# Patient Record
Sex: FEMALE | Race: WHITE | NOT HISPANIC OR LATINO | Employment: OTHER | URBAN - NONMETROPOLITAN AREA
[De-identification: names, ages, dates, MRNs, and addresses within clinical notes are randomized per-mention and may not be internally consistent; named-entity substitution may affect disease eponyms.]

---

## 2021-12-09 ENCOUNTER — HOSPITAL ENCOUNTER (OUTPATIENT)
Dept: LAB | Facility: MEDICAL CENTER | Age: 66
End: 2021-12-09
Attending: STUDENT IN AN ORGANIZED HEALTH CARE EDUCATION/TRAINING PROGRAM
Payer: MEDICAID

## 2021-12-09 LAB
ALBUMIN SERPL BCP-MCNC: 4.3 G/DL (ref 3.2–4.9)
ALBUMIN/GLOB SERPL: 1.4 G/DL
ALP SERPL-CCNC: 94 U/L (ref 30–99)
ALT SERPL-CCNC: 21 U/L (ref 2–50)
ANION GAP SERPL CALC-SCNC: 12 MMOL/L (ref 7–16)
AST SERPL-CCNC: 17 U/L (ref 12–45)
BILIRUB SERPL-MCNC: 1 MG/DL (ref 0.1–1.5)
BUN SERPL-MCNC: 13 MG/DL (ref 8–22)
CALCIUM SERPL-MCNC: 9.5 MG/DL (ref 8.5–10.5)
CHLORIDE SERPL-SCNC: 99 MMOL/L (ref 96–112)
CO2 SERPL-SCNC: 26 MMOL/L (ref 20–33)
CREAT SERPL-MCNC: 0.98 MG/DL (ref 0.5–1.4)
FASTING STATUS PATIENT QL REPORTED: NORMAL
GLOBULIN SER CALC-MCNC: 3.1 G/DL (ref 1.9–3.5)
GLUCOSE SERPL-MCNC: 192 MG/DL (ref 65–99)
POTASSIUM SERPL-SCNC: 3.6 MMOL/L (ref 3.6–5.5)
PROT SERPL-MCNC: 7.4 G/DL (ref 6–8.2)
SODIUM SERPL-SCNC: 137 MMOL/L (ref 135–145)

## 2021-12-09 PROCEDURE — 80053 COMPREHEN METABOLIC PANEL: CPT

## 2021-12-09 PROCEDURE — 36415 COLL VENOUS BLD VENIPUNCTURE: CPT

## 2022-01-20 ENCOUNTER — HOSPITAL ENCOUNTER (OUTPATIENT)
Dept: LAB | Facility: MEDICAL CENTER | Age: 67
End: 2022-01-20
Attending: STUDENT IN AN ORGANIZED HEALTH CARE EDUCATION/TRAINING PROGRAM
Payer: MEDICAID

## 2022-01-20 LAB
ANION GAP SERPL CALC-SCNC: 13 MMOL/L (ref 7–16)
BUN SERPL-MCNC: 20 MG/DL (ref 8–22)
CALCIUM SERPL-MCNC: 9.8 MG/DL (ref 8.5–10.5)
CHLORIDE SERPL-SCNC: 102 MMOL/L (ref 96–112)
CO2 SERPL-SCNC: 25 MMOL/L (ref 20–33)
CREAT SERPL-MCNC: 1.02 MG/DL (ref 0.5–1.4)
FASTING STATUS PATIENT QL REPORTED: NORMAL
GLUCOSE SERPL-MCNC: 142 MG/DL (ref 65–99)
POTASSIUM SERPL-SCNC: 3.6 MMOL/L (ref 3.6–5.5)
SODIUM SERPL-SCNC: 140 MMOL/L (ref 135–145)

## 2022-01-20 PROCEDURE — 36415 COLL VENOUS BLD VENIPUNCTURE: CPT

## 2022-01-20 PROCEDURE — 80048 BASIC METABOLIC PNL TOTAL CA: CPT

## 2022-05-18 ENCOUNTER — APPOINTMENT (OUTPATIENT)
Dept: RADIOLOGY | Facility: MEDICAL CENTER | Age: 67
DRG: 698 | End: 2022-05-18
Attending: EMERGENCY MEDICINE
Payer: MEDICAID

## 2022-05-18 ENCOUNTER — HOSPITAL ENCOUNTER (INPATIENT)
Facility: MEDICAL CENTER | Age: 67
LOS: 5 days | DRG: 698 | End: 2022-05-23
Attending: EMERGENCY MEDICINE | Admitting: INTERNAL MEDICINE
Payer: MEDICAID

## 2022-05-18 ENCOUNTER — HOSPITAL ENCOUNTER (OUTPATIENT)
Facility: MEDICAL CENTER | Age: 67
DRG: 698 | End: 2022-05-18
Attending: NURSE PRACTITIONER
Payer: MEDICAID

## 2022-05-18 DIAGNOSIS — N39.0 COMPLICATED UTI (URINARY TRACT INFECTION): ICD-10-CM

## 2022-05-18 DIAGNOSIS — N10 ACUTE PYELONEPHRITIS: ICD-10-CM

## 2022-05-18 DIAGNOSIS — R78.81 BACTEREMIA: ICD-10-CM

## 2022-05-18 DIAGNOSIS — A41.9 SEPSIS WITHOUT ACUTE ORGAN DYSFUNCTION, DUE TO UNSPECIFIED ORGANISM (HCC): ICD-10-CM

## 2022-05-18 LAB
ALBUMIN SERPL BCP-MCNC: 3.7 G/DL (ref 3.2–4.9)
ALBUMIN/GLOB SERPL: 1.2 G/DL
ALP SERPL-CCNC: 99 U/L (ref 30–99)
ALT SERPL-CCNC: 10 U/L (ref 2–50)
ANION GAP SERPL CALC-SCNC: 13 MMOL/L (ref 7–16)
APPEARANCE UR: ABNORMAL
AST SERPL-CCNC: 15 U/L (ref 12–45)
BACTERIA #/AREA URNS HPF: ABNORMAL /HPF
BASOPHILS # BLD AUTO: 0.6 % (ref 0–1.8)
BASOPHILS # BLD: 0.07 K/UL (ref 0–0.12)
BILIRUB SERPL-MCNC: 1.1 MG/DL (ref 0.1–1.5)
BILIRUB UR QL STRIP.AUTO: NEGATIVE
BUN SERPL-MCNC: 12 MG/DL (ref 8–22)
CALCIUM SERPL-MCNC: 8.8 MG/DL (ref 8.5–10.5)
CHLORIDE SERPL-SCNC: 101 MMOL/L (ref 96–112)
CO2 SERPL-SCNC: 22 MMOL/L (ref 20–33)
COLOR UR: YELLOW
CREAT SERPL-MCNC: 0.78 MG/DL (ref 0.5–1.4)
EOSINOPHIL # BLD AUTO: 0.09 K/UL (ref 0–0.51)
EOSINOPHIL NFR BLD: 0.7 % (ref 0–6.9)
EPI CELLS #/AREA URNS HPF: NEGATIVE /HPF
ERYTHROCYTE [DISTWIDTH] IN BLOOD BY AUTOMATED COUNT: 40.6 FL (ref 35.9–50)
GFR SERPLBLD CREATININE-BSD FMLA CKD-EPI: 84 ML/MIN/1.73 M 2
GLOBULIN SER CALC-MCNC: 3.2 G/DL (ref 1.9–3.5)
GLUCOSE BLD STRIP.AUTO-MCNC: 166 MG/DL (ref 65–99)
GLUCOSE SERPL-MCNC: 164 MG/DL (ref 65–99)
GLUCOSE UR STRIP.AUTO-MCNC: NEGATIVE MG/DL
HCT VFR BLD AUTO: 38 % (ref 37–47)
HGB BLD-MCNC: 12.3 G/DL (ref 12–16)
IMM GRANULOCYTES # BLD AUTO: 0.06 K/UL (ref 0–0.11)
IMM GRANULOCYTES NFR BLD AUTO: 0.5 % (ref 0–0.9)
KETONES UR STRIP.AUTO-MCNC: NEGATIVE MG/DL
LACTATE BLD-SCNC: 1.4 MMOL/L (ref 0.5–2)
LEUKOCYTE ESTERASE UR QL STRIP.AUTO: ABNORMAL
LYMPHOCYTES # BLD AUTO: 1.33 K/UL (ref 1–4.8)
LYMPHOCYTES NFR BLD: 10.8 % (ref 22–41)
MCH RBC QN AUTO: 26.8 PG (ref 27–33)
MCHC RBC AUTO-ENTMCNC: 32.4 G/DL (ref 33.6–35)
MCV RBC AUTO: 82.8 FL (ref 81.4–97.8)
MICRO URNS: ABNORMAL
MONOCYTES # BLD AUTO: 0.75 K/UL (ref 0–0.85)
MONOCYTES NFR BLD AUTO: 6.1 % (ref 0–13.4)
NEUTROPHILS # BLD AUTO: 10.04 K/UL (ref 2–7.15)
NEUTROPHILS NFR BLD: 81.3 % (ref 44–72)
NITRITE UR QL STRIP.AUTO: POSITIVE
NRBC # BLD AUTO: 0 K/UL
NRBC BLD-RTO: 0 /100 WBC
PH UR STRIP.AUTO: 6.5 [PH] (ref 5–8)
PLATELET # BLD AUTO: 245 K/UL (ref 164–446)
PMV BLD AUTO: 9.6 FL (ref 9–12.9)
POTASSIUM SERPL-SCNC: 3.4 MMOL/L (ref 3.6–5.5)
PROT SERPL-MCNC: 6.9 G/DL (ref 6–8.2)
PROT UR QL STRIP: 100 MG/DL
RBC # BLD AUTO: 4.59 M/UL (ref 4.2–5.4)
RBC # URNS HPF: ABNORMAL /HPF
RBC UR QL AUTO: ABNORMAL
SODIUM SERPL-SCNC: 136 MMOL/L (ref 135–145)
SP GR UR STRIP.AUTO: 1.01
UROBILINOGEN UR STRIP.AUTO-MCNC: 0.2 MG/DL
WBC # BLD AUTO: 12.3 K/UL (ref 4.8–10.8)
WBC #/AREA URNS HPF: ABNORMAL /HPF

## 2022-05-18 PROCEDURE — 81001 URINALYSIS AUTO W/SCOPE: CPT

## 2022-05-18 PROCEDURE — 82962 GLUCOSE BLOOD TEST: CPT

## 2022-05-18 PROCEDURE — 87077 CULTURE AEROBIC IDENTIFY: CPT

## 2022-05-18 PROCEDURE — 700111 HCHG RX REV CODE 636 W/ 250 OVERRIDE (IP): Performed by: EMERGENCY MEDICINE

## 2022-05-18 PROCEDURE — 96374 THER/PROPH/DIAG INJ IV PUSH: CPT

## 2022-05-18 PROCEDURE — 96375 TX/PRO/DX INJ NEW DRUG ADDON: CPT

## 2022-05-18 PROCEDURE — 76775 US EXAM ABDO BACK WALL LIM: CPT

## 2022-05-18 PROCEDURE — 87186 SC STD MICRODIL/AGAR DIL: CPT

## 2022-05-18 PROCEDURE — 71045 X-RAY EXAM CHEST 1 VIEW: CPT

## 2022-05-18 PROCEDURE — 74018 RADEX ABDOMEN 1 VIEW: CPT

## 2022-05-18 PROCEDURE — 770001 HCHG ROOM/CARE - MED/SURG/GYN PRIV*

## 2022-05-18 PROCEDURE — 83605 ASSAY OF LACTIC ACID: CPT

## 2022-05-18 PROCEDURE — 87040 BLOOD CULTURE FOR BACTERIA: CPT

## 2022-05-18 PROCEDURE — 700105 HCHG RX REV CODE 258: Performed by: EMERGENCY MEDICINE

## 2022-05-18 PROCEDURE — 87086 URINE CULTURE/COLONY COUNT: CPT

## 2022-05-18 PROCEDURE — 85025 COMPLETE CBC W/AUTO DIFF WBC: CPT

## 2022-05-18 PROCEDURE — 36415 COLL VENOUS BLD VENIPUNCTURE: CPT

## 2022-05-18 PROCEDURE — 99285 EMERGENCY DEPT VISIT HI MDM: CPT

## 2022-05-18 PROCEDURE — 80053 COMPREHEN METABOLIC PANEL: CPT

## 2022-05-18 PROCEDURE — 99223 1ST HOSP IP/OBS HIGH 75: CPT | Performed by: INTERNAL MEDICINE

## 2022-05-18 RX ORDER — ATORVASTATIN CALCIUM 40 MG/1
40 TABLET, FILM COATED ORAL
Status: DISCONTINUED | OUTPATIENT
Start: 2022-05-19 | End: 2022-05-23 | Stop reason: HOSPADM

## 2022-05-18 RX ORDER — ONDANSETRON 4 MG/1
4 TABLET, ORALLY DISINTEGRATING ORAL EVERY 4 HOURS PRN
Status: DISCONTINUED | OUTPATIENT
Start: 2022-05-18 | End: 2022-05-23 | Stop reason: HOSPADM

## 2022-05-18 RX ORDER — CETIRIZINE HYDROCHLORIDE 10 MG/1
10 TABLET ORAL DAILY
COMMUNITY
Start: 2022-03-11

## 2022-05-18 RX ORDER — DEXTROSE MONOHYDRATE 25 G/50ML
25 INJECTION, SOLUTION INTRAVENOUS
Status: DISCONTINUED | OUTPATIENT
Start: 2022-05-18 | End: 2022-05-23 | Stop reason: HOSPADM

## 2022-05-18 RX ORDER — TRIAMTERENE AND HYDROCHLOROTHIAZIDE 37.5; 25 MG/1; MG/1
1 CAPSULE ORAL DAILY
Status: DISCONTINUED | OUTPATIENT
Start: 2022-05-19 | End: 2022-05-23 | Stop reason: HOSPADM

## 2022-05-18 RX ORDER — BISACODYL 10 MG
10 SUPPOSITORY, RECTAL RECTAL
Status: DISCONTINUED | OUTPATIENT
Start: 2022-05-18 | End: 2022-05-23 | Stop reason: HOSPADM

## 2022-05-18 RX ORDER — AMOXICILLIN 250 MG
2 CAPSULE ORAL 2 TIMES DAILY
Status: DISCONTINUED | OUTPATIENT
Start: 2022-05-19 | End: 2022-05-23 | Stop reason: HOSPADM

## 2022-05-18 RX ORDER — INSULIN DETEMIR 100 [IU]/ML
105 INJECTION, SOLUTION SUBCUTANEOUS EVERY EVENING
COMMUNITY
Start: 2022-04-11

## 2022-05-18 RX ORDER — SODIUM CHLORIDE 9 MG/ML
INJECTION, SOLUTION INTRAVENOUS CONTINUOUS
Status: DISCONTINUED | OUTPATIENT
Start: 2022-05-19 | End: 2022-05-22

## 2022-05-18 RX ORDER — TRIAMTERENE AND HYDROCHLOROTHIAZIDE 37.5; 25 MG/1; MG/1
1 CAPSULE ORAL DAILY
COMMUNITY
Start: 2022-03-25

## 2022-05-18 RX ORDER — TAMSULOSIN HYDROCHLORIDE 0.4 MG/1
0.4 CAPSULE ORAL DAILY
Status: ON HOLD | COMMUNITY
End: 2022-05-23 | Stop reason: SDUPTHER

## 2022-05-18 RX ORDER — ATORVASTATIN CALCIUM 40 MG/1
40 TABLET, FILM COATED ORAL
COMMUNITY
Start: 2022-03-26

## 2022-05-18 RX ORDER — ONDANSETRON 2 MG/ML
4 INJECTION INTRAMUSCULAR; INTRAVENOUS ONCE
Status: COMPLETED | OUTPATIENT
Start: 2022-05-18 | End: 2022-05-18

## 2022-05-18 RX ORDER — ACETAMINOPHEN 325 MG/1
650 TABLET ORAL EVERY 6 HOURS PRN
Status: DISCONTINUED | OUTPATIENT
Start: 2022-05-18 | End: 2022-05-23 | Stop reason: HOSPADM

## 2022-05-18 RX ORDER — POTASSIUM CHLORIDE 750 MG/1
10 TABLET, FILM COATED, EXTENDED RELEASE ORAL DAILY
Status: DISCONTINUED | OUTPATIENT
Start: 2022-05-19 | End: 2022-05-19

## 2022-05-18 RX ORDER — ONDANSETRON 2 MG/ML
4 INJECTION INTRAMUSCULAR; INTRAVENOUS EVERY 4 HOURS PRN
Status: DISCONTINUED | OUTPATIENT
Start: 2022-05-18 | End: 2022-05-23 | Stop reason: HOSPADM

## 2022-05-18 RX ORDER — INSULIN LISPRO 100 [IU]/ML
2-12 INJECTION, SOLUTION INTRAVENOUS; SUBCUTANEOUS
Status: DISCONTINUED | OUTPATIENT
Start: 2022-05-19 | End: 2022-05-23 | Stop reason: HOSPADM

## 2022-05-18 RX ORDER — METOCLOPRAMIDE HYDROCHLORIDE 5 MG/ML
10 INJECTION INTRAMUSCULAR; INTRAVENOUS ONCE
Status: COMPLETED | OUTPATIENT
Start: 2022-05-18 | End: 2022-05-18

## 2022-05-18 RX ORDER — KETOROLAC TROMETHAMINE 30 MG/ML
15 INJECTION, SOLUTION INTRAMUSCULAR; INTRAVENOUS ONCE
Status: COMPLETED | OUTPATIENT
Start: 2022-05-18 | End: 2022-05-18

## 2022-05-18 RX ORDER — TAMSULOSIN HYDROCHLORIDE 0.4 MG/1
0.4 CAPSULE ORAL DAILY
Status: DISCONTINUED | OUTPATIENT
Start: 2022-05-19 | End: 2022-05-23 | Stop reason: HOSPADM

## 2022-05-18 RX ORDER — INSULIN LISPRO 100 [IU]/ML
15 INJECTION, SOLUTION INTRAVENOUS; SUBCUTANEOUS
Status: DISCONTINUED | OUTPATIENT
Start: 2022-05-19 | End: 2022-05-23 | Stop reason: HOSPADM

## 2022-05-18 RX ORDER — SODIUM CHLORIDE 9 MG/ML
1000 INJECTION, SOLUTION INTRAVENOUS ONCE
Status: COMPLETED | OUTPATIENT
Start: 2022-05-18 | End: 2022-05-19

## 2022-05-18 RX ORDER — HYDROCODONE BITARTRATE AND ACETAMINOPHEN 5; 325 MG/1; MG/1
1 TABLET ORAL EVERY 8 HOURS PRN
Status: DISCONTINUED | OUTPATIENT
Start: 2022-05-18 | End: 2022-05-23 | Stop reason: HOSPADM

## 2022-05-18 RX ORDER — HYDROCODONE BITARTRATE AND ACETAMINOPHEN 5; 325 MG/1; MG/1
1 TABLET ORAL EVERY 8 HOURS PRN
COMMUNITY
End: 2022-06-01

## 2022-05-18 RX ORDER — POLYETHYLENE GLYCOL 3350 17 G/17G
1 POWDER, FOR SOLUTION ORAL
Status: DISCONTINUED | OUTPATIENT
Start: 2022-05-18 | End: 2022-05-23 | Stop reason: HOSPADM

## 2022-05-18 RX ORDER — OMEPRAZOLE 20 MG/1
40 CAPSULE, DELAYED RELEASE ORAL DAILY
Status: DISCONTINUED | OUTPATIENT
Start: 2022-05-19 | End: 2022-05-23 | Stop reason: HOSPADM

## 2022-05-18 RX ORDER — PSEUDOEPHEDRINE HCL 30 MG
100 TABLET ORAL
COMMUNITY

## 2022-05-18 RX ORDER — POTASSIUM CHLORIDE 750 MG/1
10 TABLET, FILM COATED, EXTENDED RELEASE ORAL DAILY
COMMUNITY

## 2022-05-18 RX ORDER — B-COMPLEX WITH VITAMIN C
1 TABLET ORAL DAILY
COMMUNITY
End: 2022-06-01

## 2022-05-18 RX ORDER — OXYBUTYNIN CHLORIDE 5 MG/1
5 TABLET ORAL 3 TIMES DAILY PRN
COMMUNITY
End: 2022-06-01

## 2022-05-18 RX ORDER — OMEPRAZOLE 40 MG/1
40 CAPSULE, DELAYED RELEASE ORAL DAILY
COMMUNITY
Start: 2022-02-23

## 2022-05-18 RX ORDER — CHOLECALCIFEROL (VITAMIN D3) 125 MCG
500 CAPSULE ORAL DAILY
Status: DISCONTINUED | OUTPATIENT
Start: 2022-05-19 | End: 2022-05-23 | Stop reason: HOSPADM

## 2022-05-18 RX ORDER — OXYBUTYNIN CHLORIDE 5 MG/1
5 TABLET ORAL 3 TIMES DAILY PRN
Status: DISCONTINUED | OUTPATIENT
Start: 2022-05-18 | End: 2022-05-19

## 2022-05-18 RX ORDER — CETIRIZINE HYDROCHLORIDE 10 MG/1
10 TABLET ORAL DAILY
Status: DISCONTINUED | OUTPATIENT
Start: 2022-05-19 | End: 2022-05-23 | Stop reason: HOSPADM

## 2022-05-18 RX ORDER — CEFTRIAXONE 2 G/1
2 INJECTION, POWDER, FOR SOLUTION INTRAMUSCULAR; INTRAVENOUS ONCE
Status: COMPLETED | OUTPATIENT
Start: 2022-05-18 | End: 2022-05-18

## 2022-05-18 RX ADMIN — CEFTRIAXONE SODIUM 2 G: 2 INJECTION, POWDER, FOR SOLUTION INTRAMUSCULAR; INTRAVENOUS at 21:07

## 2022-05-18 RX ADMIN — METOCLOPRAMIDE 10 MG: 5 INJECTION, SOLUTION INTRAMUSCULAR; INTRAVENOUS at 23:19

## 2022-05-18 RX ADMIN — SODIUM CHLORIDE 1000 ML: 9 INJECTION, SOLUTION INTRAVENOUS at 21:23

## 2022-05-18 RX ADMIN — KETOROLAC TROMETHAMINE 15 MG: 30 INJECTION, SOLUTION INTRAMUSCULAR at 23:19

## 2022-05-18 RX ADMIN — ONDANSETRON 4 MG: 2 INJECTION INTRAMUSCULAR; INTRAVENOUS at 21:12

## 2022-05-19 PROBLEM — E11.9 DM (DIABETES MELLITUS) (HCC): Status: ACTIVE | Noted: 2022-05-19

## 2022-05-19 PROBLEM — N39.0 COMPLICATED UTI (URINARY TRACT INFECTION): Status: ACTIVE | Noted: 2022-05-19

## 2022-05-19 PROBLEM — I10 HTN (HYPERTENSION): Status: ACTIVE | Noted: 2022-05-19

## 2022-05-19 PROBLEM — J30.2 SEASONAL ALLERGIC RHINITIS: Status: ACTIVE | Noted: 2022-05-19

## 2022-05-19 PROBLEM — I63.9 CVA (CEREBRAL VASCULAR ACCIDENT) (HCC): Status: ACTIVE | Noted: 2022-05-19

## 2022-05-19 PROBLEM — K21.9 GERD (GASTROESOPHAGEAL REFLUX DISEASE): Status: ACTIVE | Noted: 2022-05-19

## 2022-05-19 LAB
ANION GAP SERPL CALC-SCNC: 11 MMOL/L (ref 7–16)
BUN SERPL-MCNC: 13 MG/DL (ref 8–22)
CALCIUM SERPL-MCNC: 8.1 MG/DL (ref 8.5–10.5)
CHLORIDE SERPL-SCNC: 100 MMOL/L (ref 96–112)
CO2 SERPL-SCNC: 24 MMOL/L (ref 20–33)
CREAT SERPL-MCNC: 0.97 MG/DL (ref 0.5–1.4)
ERYTHROCYTE [DISTWIDTH] IN BLOOD BY AUTOMATED COUNT: 42.1 FL (ref 35.9–50)
EST. AVERAGE GLUCOSE BLD GHB EST-MCNC: 134 MG/DL
GFR SERPLBLD CREATININE-BSD FMLA CKD-EPI: 65 ML/MIN/1.73 M 2
GLUCOSE BLD STRIP.AUTO-MCNC: 163 MG/DL (ref 65–99)
GLUCOSE BLD STRIP.AUTO-MCNC: 164 MG/DL (ref 65–99)
GLUCOSE BLD STRIP.AUTO-MCNC: 186 MG/DL (ref 65–99)
GLUCOSE SERPL-MCNC: 170 MG/DL (ref 65–99)
HBA1C MFR BLD: 6.3 % (ref 4–5.6)
HCT VFR BLD AUTO: 34.2 % (ref 37–47)
HGB BLD-MCNC: 11.2 G/DL (ref 12–16)
MCH RBC QN AUTO: 27.5 PG (ref 27–33)
MCHC RBC AUTO-ENTMCNC: 32.7 G/DL (ref 33.6–35)
MCV RBC AUTO: 84 FL (ref 81.4–97.8)
PLATELET # BLD AUTO: 206 K/UL (ref 164–446)
PMV BLD AUTO: 9.8 FL (ref 9–12.9)
POTASSIUM SERPL-SCNC: 3.3 MMOL/L (ref 3.6–5.5)
RBC # BLD AUTO: 4.07 M/UL (ref 4.2–5.4)
SODIUM SERPL-SCNC: 135 MMOL/L (ref 135–145)
WBC # BLD AUTO: 11.6 K/UL (ref 4.8–10.8)

## 2022-05-19 PROCEDURE — 36415 COLL VENOUS BLD VENIPUNCTURE: CPT

## 2022-05-19 PROCEDURE — A9270 NON-COVERED ITEM OR SERVICE: HCPCS | Performed by: STUDENT IN AN ORGANIZED HEALTH CARE EDUCATION/TRAINING PROGRAM

## 2022-05-19 PROCEDURE — 770001 HCHG ROOM/CARE - MED/SURG/GYN PRIV*

## 2022-05-19 PROCEDURE — 83036 HEMOGLOBIN GLYCOSYLATED A1C: CPT

## 2022-05-19 PROCEDURE — 96372 THER/PROPH/DIAG INJ SC/IM: CPT

## 2022-05-19 PROCEDURE — 96375 TX/PRO/DX INJ NEW DRUG ADDON: CPT

## 2022-05-19 PROCEDURE — 700111 HCHG RX REV CODE 636 W/ 250 OVERRIDE (IP): Performed by: INTERNAL MEDICINE

## 2022-05-19 PROCEDURE — 700105 HCHG RX REV CODE 258: Performed by: INTERNAL MEDICINE

## 2022-05-19 PROCEDURE — 700111 HCHG RX REV CODE 636 W/ 250 OVERRIDE (IP): Performed by: STUDENT IN AN ORGANIZED HEALTH CARE EDUCATION/TRAINING PROGRAM

## 2022-05-19 PROCEDURE — 700102 HCHG RX REV CODE 250 W/ 637 OVERRIDE(OP): Performed by: INTERNAL MEDICINE

## 2022-05-19 PROCEDURE — 85027 COMPLETE CBC AUTOMATED: CPT

## 2022-05-19 PROCEDURE — 700102 HCHG RX REV CODE 250 W/ 637 OVERRIDE(OP): Performed by: STUDENT IN AN ORGANIZED HEALTH CARE EDUCATION/TRAINING PROGRAM

## 2022-05-19 PROCEDURE — 96376 TX/PRO/DX INJ SAME DRUG ADON: CPT

## 2022-05-19 PROCEDURE — 700105 HCHG RX REV CODE 258: Performed by: STUDENT IN AN ORGANIZED HEALTH CARE EDUCATION/TRAINING PROGRAM

## 2022-05-19 PROCEDURE — 82962 GLUCOSE BLOOD TEST: CPT

## 2022-05-19 PROCEDURE — 80048 BASIC METABOLIC PNL TOTAL CA: CPT

## 2022-05-19 PROCEDURE — A9270 NON-COVERED ITEM OR SERVICE: HCPCS | Performed by: INTERNAL MEDICINE

## 2022-05-19 PROCEDURE — 99233 SBSQ HOSP IP/OBS HIGH 50: CPT | Performed by: STUDENT IN AN ORGANIZED HEALTH CARE EDUCATION/TRAINING PROGRAM

## 2022-05-19 RX ORDER — OXYBUTYNIN CHLORIDE 5 MG/1
5 TABLET ORAL 3 TIMES DAILY PRN
Status: DISCONTINUED | OUTPATIENT
Start: 2022-05-19 | End: 2022-05-23 | Stop reason: HOSPADM

## 2022-05-19 RX ORDER — POTASSIUM CHLORIDE 750 MG/1
40 TABLET, FILM COATED, EXTENDED RELEASE ORAL ONCE
Status: COMPLETED | OUTPATIENT
Start: 2022-05-19 | End: 2022-05-19

## 2022-05-19 RX ADMIN — ATORVASTATIN CALCIUM 40 MG: 40 TABLET, FILM COATED ORAL at 20:43

## 2022-05-19 RX ADMIN — OMEPRAZOLE 40 MG: 20 CAPSULE, DELAYED RELEASE ORAL at 05:34

## 2022-05-19 RX ADMIN — CEFEPIME 2 G: 2 INJECTION, POWDER, FOR SOLUTION INTRAVENOUS at 10:36

## 2022-05-19 RX ADMIN — ACETAMINOPHEN 650 MG: 325 TABLET ORAL at 11:31

## 2022-05-19 RX ADMIN — POTASSIUM CHLORIDE 10 MEQ: 750 TABLET, FILM COATED, EXTENDED RELEASE ORAL at 05:34

## 2022-05-19 RX ADMIN — INSULIN GLARGINE-YFGN 50 UNITS: 100 INJECTION, SOLUTION SUBCUTANEOUS at 20:44

## 2022-05-19 RX ADMIN — CYANOCOBALAMIN TAB 500 MCG 500 MCG: 500 TAB at 05:34

## 2022-05-19 RX ADMIN — POTASSIUM CHLORIDE 40 MEQ: 750 TABLET, FILM COATED, EXTENDED RELEASE ORAL at 10:36

## 2022-05-19 RX ADMIN — INSULIN LISPRO 2 UNITS: 100 INJECTION, SOLUTION INTRAVENOUS; SUBCUTANEOUS at 14:33

## 2022-05-19 RX ADMIN — INSULIN LISPRO 2 UNITS: 100 INJECTION, SOLUTION INTRAVENOUS; SUBCUTANEOUS at 20:45

## 2022-05-19 RX ADMIN — CEFEPIME 2 G: 2 INJECTION, POWDER, FOR SOLUTION INTRAVENOUS at 21:42

## 2022-05-19 RX ADMIN — ONDANSETRON 4 MG: 4 TABLET, ORALLY DISINTEGRATING ORAL at 11:37

## 2022-05-19 RX ADMIN — SODIUM CHLORIDE: 9 INJECTION, SOLUTION INTRAVENOUS at 02:10

## 2022-05-19 RX ADMIN — CETIRIZINE HYDROCHLORIDE 10 MG: 10 TABLET, FILM COATED ORAL at 05:34

## 2022-05-19 RX ADMIN — SENNOSIDES AND DOCUSATE SODIUM 2 TABLET: 50; 8.6 TABLET ORAL at 20:43

## 2022-05-19 RX ADMIN — TRIAMTERENE AND HYDROCHLOROTHIAZIDE 1 CAPSULE: 37.5; 25 CAPSULE ORAL at 05:34

## 2022-05-19 RX ADMIN — CEFEPIME 2 G: 2 INJECTION, POWDER, FOR SOLUTION INTRAVENOUS at 15:05

## 2022-05-19 RX ADMIN — CHOLECALCIFEROL TAB 125 MCG (5000 UNIT) 5000 UNITS: 125 TAB at 05:34

## 2022-05-19 RX ADMIN — INSULIN LISPRO 15 UNITS: 100 INJECTION, SOLUTION INTRAVENOUS; SUBCUTANEOUS at 14:31

## 2022-05-19 RX ADMIN — TAMSULOSIN HYDROCHLORIDE 0.4 MG: 0.4 CAPSULE ORAL at 05:34

## 2022-05-19 RX ADMIN — ATORVASTATIN CALCIUM 40 MG: 40 TABLET, FILM COATED ORAL at 02:04

## 2022-05-19 ASSESSMENT — ENCOUNTER SYMPTOMS
MYALGIAS: 0
INSOMNIA: 0
CHILLS: 1
FLANK PAIN: 0
EYE PAIN: 0
PALPITATIONS: 0
DOUBLE VISION: 0
NAUSEA: 1
DIAPHORESIS: 1
BRUISES/BLEEDS EASILY: 0
FALLS: 0
DIARRHEA: 0
NAUSEA: 0
SHORTNESS OF BREATH: 0
DIAPHORESIS: 0
DEPRESSION: 0
CONSTIPATION: 0
ABDOMINAL PAIN: 1
FEVER: 1
COUGH: 0
HEADACHES: 0
BACK PAIN: 0
DIZZINESS: 0
SENSORY CHANGE: 0
VOMITING: 0
BLURRED VISION: 0
VOMITING: 1
HEMOPTYSIS: 0
FOCAL WEAKNESS: 0
NECK PAIN: 0

## 2022-05-19 ASSESSMENT — COGNITIVE AND FUNCTIONAL STATUS - GENERAL
SUGGESTED CMS G CODE MODIFIER MOBILITY: CH
MOBILITY SCORE: 24
DRESSING REGULAR LOWER BODY CLOTHING: A LITTLE
SUGGESTED CMS G CODE MODIFIER DAILY ACTIVITY: CI
DAILY ACTIVITIY SCORE: 23

## 2022-05-19 ASSESSMENT — PATIENT HEALTH QUESTIONNAIRE - PHQ9
1. LITTLE INTEREST OR PLEASURE IN DOING THINGS: NOT AT ALL
2. FEELING DOWN, DEPRESSED, IRRITABLE, OR HOPELESS: NOT AT ALL
SUM OF ALL RESPONSES TO PHQ9 QUESTIONS 1 AND 2: 0

## 2022-05-19 ASSESSMENT — LIFESTYLE VARIABLES
EVER HAD A DRINK FIRST THING IN THE MORNING TO STEADY YOUR NERVES TO GET RID OF A HANGOVER: NO
HOW MANY TIMES IN THE PAST YEAR HAVE YOU HAD 5 OR MORE DRINKS IN A DAY: 0
DOES PATIENT WANT TO STOP DRINKING: NO
HAVE YOU EVER FELT YOU SHOULD CUT DOWN ON YOUR DRINKING: NO
ON A TYPICAL DAY WHEN YOU DRINK ALCOHOL HOW MANY DRINKS DO YOU HAVE: 0
SUBSTANCE_ABUSE: 0
ALCOHOL_USE: NO
EVER FELT BAD OR GUILTY ABOUT YOUR DRINKING: NO
AVERAGE NUMBER OF DAYS PER WEEK YOU HAVE A DRINK CONTAINING ALCOHOL: 0
CONSUMPTION TOTAL: NEGATIVE
HAVE PEOPLE ANNOYED YOU BY CRITICIZING YOUR DRINKING: NO
TOTAL SCORE: 0

## 2022-05-19 ASSESSMENT — FIBROSIS 4 INDEX: FIB4 SCORE: 1.47

## 2022-05-19 NOTE — PROGRESS NOTES
Hospital Medicine Daily Progress Note    Date of Service  5/19/2022    Chief Complaint  Ольга Morales is a 66 y.o. female admitted 5/18/2022 with abdominal pain, fever.    Hospital Course  Ольга Morales is a 66 y.o. female who presented 5/18/2022 with Abd pain.  She has not been feeling well since the 10th when she was seen in for a kidney stone and a stent was placed.  Stent had been placed initially because they were unable to extract the stone ou. She was given medications with which it was expected that the stone the stone would pass. She was slated to be seen by urology on June 2 for follow-up.  Since then she has been having abdominal pain on and off which has slowly worsened over the last couple of days. She has felt nauseous she has felt feverish shaking with chills especially over the last 2 days which prompted her to come to the ER today.  She is not having any flank pain any increased urinary frequency above her baseline.      Interval Problem Update  Admitted overnight for sepsis due to UTI, hx recent ureteral stent placement for kidney stone.  Blood culture 5/18 growing GNR's.  Given recent hospital instrumentation, will cover for pseudomonas.  Start cefepime. Follow up culture results.  On IV fluids.  Urology do not plan for operative intervention at this time. They recommend antibiotics and keeping 6/2/22 surgery date for stone treatment and stent removal.    I have personally seen and examined the patient at bedside. I discussed the plan of care with patient.    Consultants/Specialty  urology    Code Status  Full Code    Disposition  Patient is not medically cleared for discharge.   Anticipate discharge to to home with close outpatient follow-up.  I have placed the appropriate orders for post-discharge needs.    Review of Systems  Review of Systems   Constitutional: Positive for chills and fever. Negative for diaphoresis and malaise/fatigue.   Eyes: Negative for blurred vision and pain.    Respiratory: Negative for cough and shortness of breath.    Cardiovascular: Negative for chest pain, palpitations and leg swelling.   Gastrointestinal: Positive for abdominal pain. Negative for constipation, diarrhea, nausea and vomiting.   Genitourinary: Positive for dysuria. Negative for flank pain and urgency.   Musculoskeletal: Negative for back pain and falls.   Skin: Negative for itching and rash.   Neurological: Negative for dizziness, sensory change, focal weakness and headaches.   Psychiatric/Behavioral: Negative for substance abuse. The patient does not have insomnia.         Physical Exam  Temp:  [37.1 °C (98.7 °F)-38.1 °C (100.5 °F)] 37.1 °C (98.7 °F)  Pulse:  [] 76  Resp:  [16-18] 16  BP: (103-199)/(53-91) 120/56  SpO2:  [87 %-98 %] 97 %    Physical Exam  Vitals reviewed.   Constitutional:       General: She is not in acute distress.     Appearance: She is obese. She is not diaphoretic.   HENT:      Head: Normocephalic and atraumatic.      Right Ear: External ear normal.      Left Ear: External ear normal.      Nose: Nose normal. No congestion.      Mouth/Throat:      Pharynx: No oropharyngeal exudate or posterior oropharyngeal erythema.   Eyes:      Extraocular Movements: Extraocular movements intact.      Pupils: Pupils are equal, round, and reactive to light.   Cardiovascular:      Rate and Rhythm: Normal rate and regular rhythm.   Pulmonary:      Effort: Pulmonary effort is normal. No respiratory distress.      Breath sounds: Normal breath sounds. No wheezing or rales.   Abdominal:      General: Bowel sounds are normal. There is no distension.      Palpations: Abdomen is soft.      Tenderness: There is abdominal tenderness. There is no right CVA tenderness, left CVA tenderness, guarding or rebound.   Musculoskeletal:         General: No swelling. Normal range of motion.      Cervical back: Normal range of motion and neck supple.      Right lower leg: No edema.      Left lower leg: No edema.    Skin:     General: Skin is warm and dry.   Neurological:      General: No focal deficit present.      Mental Status: She is alert and oriented to person, place, and time.   Psychiatric:         Mood and Affect: Mood normal.         Behavior: Behavior normal.         Fluids  No intake or output data in the 24 hours ending 05/19/22 1400    Laboratory  Recent Labs     05/18/22  1740 05/19/22  0340   WBC 12.3* 11.6*   RBC 4.59 4.07*   HEMOGLOBIN 12.3 11.2*   HEMATOCRIT 38.0 34.2*   MCV 82.8 84.0   MCH 26.8* 27.5   MCHC 32.4* 32.7*   RDW 40.6 42.1   PLATELETCT 245 206   MPV 9.6 9.8     Recent Labs     05/18/22 1740 05/19/22  0340   SODIUM 136 135   POTASSIUM 3.4* 3.3*   CHLORIDE 101 100   CO2 22 24   GLUCOSE 164* 170*   BUN 12 13   CREATININE 0.78 0.97   CALCIUM 8.8 8.1*                   Imaging  US-RENAL   Final Result         1.  Dilated right renal pelvis, consider extra renal pelvis morphology or mild hydronephrosis.   2.  Echogenic liver, compatible with fatty change versus fibrosis.      [id number] VIEW   Final Result         1.  Moderate stool in the colon suggests changes of constipation, otherwise nonspecific bowel gas pattern   2.  Hepatomegaly      DX-CHEST-PORTABLE (1 VIEW)   Final Result         1.  No acute cardiopulmonary disease.           Assessment/Plan  * Sepsis (HCC)- (present on admission)  Assessment & Plan  This is Sepsis Present on admission  SIRS criteria identified on my evaluation include: Fever, with temperature greater than 101 deg F and Tachycardia, with heart rate greater than 90 BPM  Source is Urine  Sepsis protocol initiated  Fluid resuscitation ordered per protocol  Crystalloid Fluid Administration: Fluid resuscitation ordered per standard protocol - 30 mL/kg per current or ideal body weight  IV antibiotics as appropriate for source of sepsis  Reassessment: I have reassessed the patient's hemodynamic status which has recovered          Complicated UTI (urinary tract  infection)  Assessment & Plan  E: Likley related to stent placement  Start cefepime given recent hospitalization and stent placement  -Will continue tamsulosin  -Urology recommend antibiotics and keeping 6/2/22 surgery date for stent removal and stone treatment, no operative intervention planned as inpatient at this time    Seasonal allergic rhinitis  Assessment & Plan  Will cont cetrizine    GERD (gastroesophageal reflux disease)  Assessment & Plan  Will cont omez      HTN (hypertension)  Assessment & Plan  Will cont home med Trimetrene-HCTZ    CVA (cerebral vascular accident) ([facility])  Assessment & Plan  -Check LDL  -Will cont asa, Atrvastatin    DM (diabetes mellitus) ([facility])  Assessment & Plan  -Will check HBA!c  -Will continue glargine, Prmeal and SSI with lowered dosing         VTE prophylaxis: xarelto ppx dose    I have performed a physical exam and reviewed and updated ROS and Plan today (5/19/2022). In review of yesterday's note (5/18/2022), there are no changes except as documented above.

## 2022-05-19 NOTE — ED TRIAGE NOTES
Chief Complaint   Patient presents with   • Post-Op Complications     Pt reports having stent placed in right kidney on 5/10 with increased fever, chills, tachy, nausea, diaphoretic beginning today.       Pt to triage from [facility] with above complaint. Pt not on home O2, now on 2L NC, RA sat 88%.    BP (!) 199/91 Comment: right upper arm 199/91 right lower arm 136/77  Pulse (!) 112   Temp 37.7 °C (99.8 °F) (Temporal)   Resp 18   Ht 1.524 m (5')   Wt 120 kg (265 lb)   SpO2 92%   BMI 51.75 kg/m²

## 2022-05-19 NOTE — ED NOTES
Med rec completed per patient and list at bedside (List returned)  Allergies reviewed  No PO Antibiotics in the last 30 days

## 2022-05-19 NOTE — ED PROVIDER NOTES
ED Provider Note    Scribed for Wu Machado M.D. by Ramses Arias. 5/18/2022,  8:35 PM.    Means of Arrival: Walk in   History obtained from: Patient   History limited by: None     CHIEF COMPLAINT  Chief Complaint   Patient presents with   • Post-Op Complications     Pt reports having stent placed in right kidney on 5/10 with increased fever, chills, tachy, nausea, diaphoretic beginning today.         HPI  Ольга Morales is a 66 y.o. female who presents to the Emergency Department for evaluation of post op complications following stent placement to right kidney onset today. Patient reports she had a stent placed in her right kidney to treat a kidney stone on 5/10/22. Patient states that the kidney stone was not removed because the opening was too small.  She was prescribed three medications post-op to help dilate opening to remove stone and has follow up surgery on the 2nd of June to remove the kidney stone. She adds that she began to have a sudden onset of low grade fever, chills, tachycardia, nausea, and diaphoresis today which prompted her to present to the ED. She presents associated symptoms of lower abdominal pain, dry mouth, and loss of appetite. Denies cough or flank pain. No alleviating factors noted at this time. She is not on oxygen at baseline. Patient is vaccinated for COVID x3. Patient has additional history of Diabetes. Patient takes insulin to manage her diabetes but states she has not been compliant as she has not been eating anything secondary to her pain.       REVIEW OF SYSTEMS  CONSTITUTIONAL:  Fever, chills, diaphoresis, and dry mouth.   CARDIOVASCULAR:  Tachycardia  RESPIRATORY:  No pleuritic chest pain. No cough   GASTROINTESTINAL:  Lower abdominal pain. Loss of appetite. Nausea  GENITOURINARY:   No dysuria.  See HPI for further details.   All other systems are negative.     PAST MEDICAL HISTORY  None reported     FAMILY HISTORY  None reported    SOCIAL HISTORY   None  reported    SURGICAL HISTORY  Stent placed on kidney     CURRENT MEDICATIONS  No current outpatient medications     ALLERGIES  Allergies   Allergen Reactions   • Aminoglycosides      itching   • Clindamycin      itching   • Codeine      Itching     • Demerol      n/v   • Lisinopril      cough   • Macrobid [Nitrofurantoin]      hives   • Metformin      diarrhea   • Naltrexone      Bad thoughts   • Oxycontin [Oxycodone]      hallucinations   • Penicillin G      itching   • Pioglitazone      itchy       PHYSICAL EXAM  VITAL SIGNS: BP (!) 151/75   Pulse (!) 108   Temp (!) 38.1 °C (100.5 °F) (Temporal)   Resp 16   Ht 1.524 m (5')   Wt 120 kg (265 lb)   SpO2 98% Comment: RA  BMI 51.75 kg/m²    Gen: Alert, no acute distress  HEENT: ATNC  Eyes: PERRL, EOMI, normal conjunctiva.   Neck: trachea midline. No CVA tenderness  Resp: no respiratory distress, lungs clear  CV: No JVD, heart normal, no murmurs   Abd: non-distended, soft. Tenderness to palpation to right mid abdomen  Ext: No deformities  Psych: normal mood  Neuro: speech fluent, moves all    DIAGNOSTIC STUDIES / PROCEDURES       LABS  Labs Reviewed   CBC WITH DIFFERENTIAL - Abnormal; Notable for the following components:       Result Value    WBC 12.3 (*)     MCH 26.8 (*)     MCHC 32.4 (*)     Neutrophils-Polys 81.30 (*)     Lymphocytes 10.80 (*)     Neutrophils (Absolute) 10.04 (*)     All other components within normal limits   COMP METABOLIC PANEL - Abnormal; Notable for the following components:    Potassium 3.4 (*)     Glucose 164 (*)     All other components within normal limits   URINALYSIS - Abnormal; Notable for the following components:    Character Cloudy (*)     Protein 100 (*)     Nitrite Positive (*)     Leukocyte Esterase Large (*)     Occult Blood Moderate (*)     All other components within normal limits   URINE MICROSCOPIC (W/UA) - Abnormal; Notable for the following components:    WBC Packed (*)     RBC 5-10 (*)     Bacteria Few (*)     All  "other components within normal limits   POCT GLUCOSE DEVICE RESULTS - Abnormal; Notable for the following components:    POC Glucose, Blood 166 (*)     All other components within normal limits   LACTIC ACID   ESTIMATED GFR   URINE CULTURE(NEW)    Narrative:     Indication for culture:->Evaluation for sepsis without a  clear source of infection   BLOOD CULTURE    Narrative:     Per Hospital Policy: Only change Specimen Src: to \"Line\" if  specified by physician order.   BLOOD CULTURE    Narrative:     Per Hospital Policy: Only change Specimen Src: to \"Line\" if  specified by physician order.   CBC WITHOUT DIFFERENTIAL   BASIC METABOLIC PANEL   HEMOGLOBIN A1C     All labs reviewed by me.    RADIOLOGY  US-RENAL   Final Result         1.  Dilated right renal pelvis, consider extra renal pelvis morphology or mild hydronephrosis.   2.  Echogenic liver, compatible with fatty change versus fibrosis.      AD-[place]-8 VIEW   Final Result         1.  Moderate stool in the colon suggests changes of constipation, otherwise nonspecific bowel gas pattern   2.  Hepatomegaly      DX-CHEST-PORTABLE (1 VIEW)   Final Result         1.  No acute cardiopulmonary disease.        The radiologist’s interpretation of all radiology studies have been reviewed by me.    COURSE & MEDICAL DECISION MAKING  Pertinent Labs & Imaging studies reviewed. (See chart for details)    9:00 PM Patient evaluated at bedside. Patient will be treated with Rocephin 2 g. Discussed with patient about administering basic labs and imaging for further evaluation. Informed patient about medication administration for pain control. Discussed with patient about plans of admit secondary to acute condition. Patient verbalizes understanding and agreement to this plan of care.     9:08 PM Patient will be treated with Zofran 4 mg and NS infusion 1000 mL     10:20 PM Paged Hospitalist and Urology    10:31 PM  - I discussed the patient's case and the above findings with [facility] who agrees to evaluate the patient for hospitalization.     10:39 PM 10:39 PM - I discussed the patient's case and the above findings with Dr. Villalta (Urology) who states that as long as stent appears to be flowing, patient should be treated with antibiotics.     HYDRATION: Based on the patient's presentation of Sepsis and Tachycardia the patient was given IV fluids. IV Hydration was used because oral hydration was not adequate alone. Upon recheck following hydration, the patient was improved.       Medical Decision Making:  Patient presents with signs of sepsis in the setting of recent instrumentation of the ureter.  Urinalysis consistent with urinary tract infection.  Patient meets sepsis criteria given the tachycardia, leukocytosis.  She was treated with empiric antibiotics.  KUB and ultrasound performed to evaluate for placement and function of the stents.  Case discussed with urology, who will consult on the case.    Low suspicion for intra-abdominal infection.    DISPOSITION:  Patient will be hospitalized by Dr. Burleson in guarded condition.     FINAL IMPRESSION  1. Sepsis without acute organ dysfunction, due to unspecified organism (HCC)    2. Acute pyelonephritis            Ramses LOGAN), am scribing for, and in the presence of, Wu Machado M.D..    Electronically signed by: Ramses Arias (Yesi), 5/18/2022    IWu M.D. personally performed the services described in this documentation, as scribed by Ramses Arias in my presence, and it is both accurate and complete.    C    The note accurately reflects work and decisions made by me.  Wu Machado M.D.  5/19/2022  2:31 AM      This dictation was created using voice recognition software. The accuracy of the dictation is limited to the abilities of the software. I expect there may be some errors of grammar and possibly content. The nursing notes were reviewed and certain aspects of this information were  incorporated into this note.

## 2022-05-19 NOTE — PROGRESS NOTES
Pt arrived to floor, vss, axox4, denies pain, pt steady on feet but scores a moderate fall risk, pt educated on fall precautions and refusing a bed alarm at this time, will provide frequent rounding.

## 2022-05-19 NOTE — H&P
Hospital Medicine History & Physical Note    Date of Service  5/19/2022    Primary Care Physician  Lucina Braswell D.O.    Consultants  Urology        Code Status  Full Code    Chief Complaint  Chief Complaint   Patient presents with   • Post-Op Complications     Pt reports having stent placed in right kidney on 5/10 with increased fever, chills, tachy, nausea, diaphoretic beginning today.         History of Presenting Illness  Ольга Morales is a 66 y.o. female who presented 5/18/2022 with Abd pain.  She has not been feeling well since the 10th when she was seen in for a kidney stone and a stent was placed.  Stent had been placed initially because they were unable to extract the stone ou. She was given medications with which it was expected that the stone the stone would pass. She was slated to be seen by urology on June 2 for follow-up.  Since then she has been having abdominal pain on and off which has slowly worsened over the last couple of days. She has felt nauseous she has felt feverish shaking with chills especially over the last 2 days which prompted her to come to the ER today.  She is not having any flank pain any increased urinary frequency above her baseline.        Review of Systems  Review of Systems   Constitutional: Positive for chills, diaphoresis, fever and malaise/fatigue.   HENT: Negative for hearing loss and tinnitus.    Eyes: Negative for blurred vision and double vision.   Respiratory: Negative for cough and hemoptysis.    Cardiovascular: Negative for chest pain and palpitations.   Gastrointestinal: Positive for abdominal pain and nausea.   Genitourinary: Negative for dysuria and flank pain.   Musculoskeletal: Negative for myalgias and neck pain.   Skin: Negative for itching and rash.   Neurological: Negative for dizziness and headaches.   Endo/Heme/Allergies: Negative for environmental allergies. Does not bruise/bleed easily.   Psychiatric/Behavioral: Negative for depression and suicidal  ideas.       Past Medical History  1.DM  2. HTN  3. CVA  4. GERD  5. LOW  7. H/O SVT        Surgical History  1. Hysterctomy  2. Appendectomy    Family History  family history is not on file.   Family history reviewed with patient. There is no family history that is pertinent to the chief complaint.     Social History  No smoking/drinking    Allergies  Allergies   Allergen Reactions   • Aminoglycosides      itching   • Clindamycin      itching   • Codeine      Itching     • Demerol      n/v   • Lisinopril      cough   • Macrobid [Nitrofurantoin]      hives   • Metformin      diarrhea   • Naltrexone      Bad thoughts   • Oxycontin [Oxycodone]      hallucinations   • Penicillin G      itching   • Pioglitazone      itchy       Medications  Prior to Admission Medications   Prescriptions Last Dose Informant Patient Reported? Taking?   B Complex-C (VITAMIN B + C COMPLEX) Tab 5/18/2022 at AM Patient Yes Yes   Sig: Take 1 Tablet by mouth every day.   HYDROcodone-acetaminophen (NORCO) 5-325 MG Tab per tablet 5/18/2022 at AM Patient Yes Yes   Sig: Take 1 Tablet by mouth every 8 hours as needed for Moderate Pain.   Psyllium 0.52 GM Cap PRN at PRN Patient Yes No   Sig: Take 1.56 g by mouth as needed for Constipation. 3 capsules   aspirin EC (ECOTRIN) 325 MG Tablet Delayed Response 5/17/2022 at PM Patient Yes Yes   Sig: Take 325 mg by mouth at bedtime.   atorvastatin (LIPITOR) 40 MG Tab 5/17/2022 at PM Patient Yes Yes   Sig: Take 40 mg by mouth at bedtime.   cetirizine (ZYRTEC) 10 MG Tab 5/18/2022 at AM Patient Yes Yes   Sig: Take 10 mg by mouth every day.   cholecalciferol (VITAMIN D3) 5000 UNIT Cap 5/18/2022 at AM Patient Yes Yes   Sig: Take 5,000 Units by mouth every day.   cyanocobalamin (VITAMIN B12) 500 MCG tablet 5/18/2022 at AM Patient Yes Yes   Sig: Take 500 mcg by mouth every day.   docusate sodium 100 MG Cap PRN at PRN Patient Yes Yes   Sig: Take 100 mg by mouth as needed for Constipation.   insulin aspart (NOVOLOG)  100 UNIT/ML injection PEN 5/18/2022 at AM Patient Yes Yes   Sig: Inject 40 Units under the skin 4 Times a Day,Before Meals and at Bedtime.   insulin detemir (LEVEMIR FLEXTOUCH) 100 UNIT/ML injection PEN 5/17/2022 at PM Patient Yes Yes   Sig: Inject 105 Units under the skin every evening.   omeprazole (PRILOSEC) 40 MG delayed-release capsule 5/18/2022 at AM Patient Yes Yes   Sig: Take 40 mg by mouth every day.   oxybutynin (DITROPAN) 5 MG Tab 5/18/2022 at AM Patient Yes No   Sig: Take 5 mg by mouth 3 times a day as needed.   potassium chloride ER (KLOR-CON) 10 MEQ tablet 5/18/2022 at AM Patient Yes Yes   Sig: Take 10 mEq by mouth every day.   tamsulosin (FLOMAX) 0.4 MG capsule 5/18/2022 at AM Patient Yes No   Sig: Take 0.4 mg by mouth every day.   triamterene/hctz (MAXZIDE-25/DYAZIDE) 37.5-25 MG Cap 5/18/2022 at AM Patient Yes Yes   Sig: Take 1 Capsule by mouth every day.      Facility-Administered Medications: None       Physical Exam  Temp:  [37.7 °C (99.8 °F)-38.1 °C (100.5 °F)] 38.1 °C (100.5 °F)  Pulse:  [] 90  Resp:  [16-18] 16  BP: (113-199)/(56-91) 117/56  SpO2:  [87 %-98 %] 96 %  Blood Pressure: 117/56   Temperature: (!) 38.1 °C (100.5 °F)   Pulse: 90   Respiration: 16   Pulse Oximetry: 96 %       Physical Exam  Vitals and nursing note reviewed.   Constitutional:       Appearance: Normal appearance.   HENT:      Head: Normocephalic and atraumatic.      Mouth/Throat:      Mouth: Mucous membranes are dry.   Eyes:      Extraocular Movements: Extraocular movements intact.      Pupils: Pupils are equal, round, and reactive to light.   Cardiovascular:      Rate and Rhythm: Normal rate and regular rhythm.      Pulses: Normal pulses.      Heart sounds: Normal heart sounds.   Pulmonary:      Effort: Pulmonary effort is normal.      Breath sounds: Normal breath sounds.   Abdominal:      General: Abdomen is flat.      Palpations: Abdomen is soft.   Musculoskeletal:         General: Normal range of motion.    Skin:     General: Skin is warm and dry.   Neurological:      General: No focal deficit present.      Mental Status: She is alert and oriented to person, place, and time.   Psychiatric:         Mood and Affect: Mood normal.         Behavior: Behavior normal.         Laboratory:  Recent Labs     05/18/22  1740   WBC 12.3*   RBC 4.59   HEMOGLOBIN 12.3   HEMATOCRIT 38.0   MCV 82.8   MCH 26.8*   MCHC 32.4*   RDW 40.6   PLATELETCT 245   MPV 9.6     Recent Labs     05/18/22  1740   SODIUM 136   POTASSIUM 3.4*   CHLORIDE 101   CO2 22   GLUCOSE 164*   BUN 12   CREATININE 0.78   CALCIUM 8.8     Recent Labs     05/18/22  1740   ALTSGPT 10   ASTSGOT 15   ALKPHOSPHAT 99   TBILIRUBIN 1.1   GLUCOSE 164*     U/A: WBC Packed    Imaging:  US-RENAL   Final Result         1.  Dilated right renal pelvis, consider extra renal pelvis morphology or mild hydronephrosis.   2.  Echogenic liver, compatible with fatty change versus fibrosis.      [id number] VIEW   Final Result         1.  Moderate stool in the colon suggests changes of constipation, otherwise nonspecific bowel gas pattern   2.  Hepatomegaly      DX-CHEST-PORTABLE (1 VIEW)   Final Result         1.  No acute cardiopulmonary disease.          ED Course: Patient was given IV fluids in the ED along with pain and antinausea medications with which she is feeling a lot better.    I discussed the plan of care with patient.      Assessment/Plan:  Justification for Admission Status  I anticipate this patient is appropriate for observation status at this time because UTI with sepsis in light of a stent treatment which will likley more than 2 days    * Sepsis (HCC)- (present on admission)  Assessment & Plan  This is Sepsis Present on admission  SIRS criteria identified on my evaluation include: Fever, with temperature greater than 101 deg F and Tachycardia, with heart rate greater than 90 BPM  Source is Urine  Sepsis protocol initiated  Fluid resuscitation ordered per  protocol  Crystalloid Fluid Administration: Fluid resuscitation ordered per standard protocol - 30 mL/kg per current or ideal body weight  IV antibiotics as appropriate for source of sepsis  Reassessment: I have reassessed the patient's hemodynamic status which has recovered          Complicated UTI (urinary tract infection)  Assessment & Plan  E: Likley related to stent placement  P: Will give ceftrixone  -Will continue tamsulosin, Tamsulosin  -Urology has been consulted by EDP    Seasonal allergic rhinitis  Assessment & Plan  Will cont cetrizine    GERD (gastroesophageal reflux disease)  Assessment & Plan  Will cont omez      HTN (hypertension)  Assessment & Plan  Will cont home med Trimetrene-HCTZ    CVA (cerebral vascular accident) ([facility])  Assessment & Plan  -Check LDL  -Will cont asa, Atrvastatin    DM (diabetes mellitus) ([facility])  Assessment & Plan  -Will check HBA!c  -Will continue glargine, Prmeal and SSI with lowered dosing        VTE prophylaxis: Xarelto 10 mg daily as prophylaxis

## 2022-05-19 NOTE — CONSULTS
[facility] [place] Consult/H&P Note      Attending: Emile Perkins M.D.  Patient's Name/MRN: Ольга Morales, [id number]    Admit Date:5/18/2022  Today's Date: 5/19/2022   Length of stay:  LOS: 1 day   Room #: RD 10/10 RED      Reason for consult/chief complaint: fevers, chills, pain following right ureteral stent placement with Dr. Carver on 5/10  ID/HPI: Ольга Morales is a 66 y.o. female patient consulted to our service for urosepsis s/p R ureteral stent placement on 5/10. She notes that immediately after surgery, she started to experience shaking chills and nausea. This however subsided with time. Again yesterday, she started to experience these same symptoms in addition to fevers and abdominal pain. She has also been experiencing urinary urgency and frequency secondary to stent discomfort.This prompted her facility care team to evaluate her and she was subsequently sent to the ED. Upon arrival, she was febrile with a white count of 12.3.Cr was within normal limits at 0.97. Urinalysis was nitrite positive and blood cultures show early growth of gram negative rods. She was started on cefepime. KUB demonstrated right ureteral stent in correct position. RBUS demonstrated mild right hydronephrosis versus extrarenal pelvis.        Past Medical History:   No past medical history on file.     Past Surgical History:   No past surgical history on file.     Family History:   No family history on file.      Social History:       Social History     Social History Narrative   • Not on file        Allergies: she Aminoglycosides, Clindamycin, Codeine, Demerol, Lisinopril, Macrobid [nitrofurantoin], Metformin, Naltrexone, Oxycontin [oxycodone], Penicillin g, and Pioglitazone    Medications:   (Not in a hospital admission)        Review of Systems  Review of Systems   Constitutional: Positive for chills and fever.   Gastrointestinal: Positive for abdominal pain, nausea and vomiting.   Genitourinary: Positive for frequency and  urgency. Negative for dysuria and hematuria.        Physical Exam  VITAL SIGNS: /72   Pulse 80   Temp 37.1 °C (98.7 °F) (Oral)   Resp 16   Ht 1.524 m (5')   Wt 120 kg (265 lb)   SpO2 95%   BMI 51.75 kg/m²   Physical Exam  Vitals and nursing note reviewed.   Constitutional:       Appearance: She is ill-appearing.   HENT:      Head: Normocephalic and atraumatic.   Pulmonary:      Effort: Pulmonary effort is normal.   Abdominal:      General: Abdomen is flat.   Neurological:      Mental Status: She is alert.   Psychiatric:         Mood and Affect: Mood normal.         Behavior: Behavior normal.           Labs:  Recent Labs     05/18/22 1740 05/19/22  0340   WBC 12.3* 11.6*   RBC 4.59 4.07*   HEMOGLOBIN 12.3 11.2*   HEMATOCRIT 38.0 34.2*   MCV 82.8 84.0   MCH 26.8* 27.5   MCHC 32.4* 32.7*   RDW 40.6 42.1   PLATELETCT 245 206   MPV 9.6 9.8     Recent Labs     05/18/22 1740 05/19/22  0340   SODIUM 136 135   POTASSIUM 3.4* 3.3*   CHLORIDE 101 100   CO2 22 24   GLUCOSE 164* 170*   BUN 12 13   CREATININE 0.78 0.97   CALCIUM 8.8 8.1*         Glucose:  Recent Labs     05/18/22 1740 05/19/22  0340   GLUCOSE 164* 170*     Coags:  No results for input(s): INR in the last 72 hours.      Urinalysis:   Recent Labs     05/18/22 2035   COLORURINE Yellow   CLARITY Cloudy*   SPECGRAVITY 1.012   PHURINE 6.5   GLUCOSEUR Negative   KETONES Negative   NITRITE Positive*   OCCULTBLOOD Moderate*   RBCURINE 5-10*   BACTERIA Few*   EPITHELCELL Negative       Imaging:  US-RENAL   Final Result         1.  Dilated right renal pelvis, consider extra renal pelvis morphology or mild hydronephrosis.   2.  Echogenic liver, compatible with fatty change versus fibrosis.      [id number] VIEW   Final Result         1.  Moderate stool in the colon suggests changes of constipation, otherwise nonspecific bowel gas pattern   2.  Hepatomegaly      DX-CHEST-PORTABLE (1 VIEW)   Final Result         1.  No acute cardiopulmonary disease.           @[facility]@     Assessment/Recommendation   66 y.o. F with urosepsis s/p right ureteral stent placement with Dr. Carver on 5/10/     · We would ask that hospitalist continue antibiotics, antiemetics, and pain control. We do not plan on surgical intervention at this time but will continue to follow along to ensure that patient is progressing properly. For now, we will plan to keep 6/2/22 surgery to remove stent and treat stone. We would ask that patient be discharged on 2 weeks of oral antibiotics pending cultures.     Thank you for this consult. Plan of care directed by Dr. Jerez. Case discussed with patient, nurse, and urology. Thank you for this consult. Please contact us with questions.        Korina Benavides, PBrinaA.-C.   [id number]  [place] [address]   [phone]

## 2022-05-19 NOTE — ED NOTES
Can we print and fax the Home DME order and office note from the patient's visit today to [employer], fax number [phone]?  The order is for a home oximetry test to see if he qualifies for home oxygen.  Their phone number is [phone].  Thanks!   Pt resting in bed, non-labored breathing. Call light within reach.

## 2022-05-19 NOTE — PROGRESS NOTES
2 RN Skin Assessment completed.   Primary: Nika  Secondary: May  Patient's risk of skin breakdown: Low    Devices in place and skin assessed under:   [] Pulse Ox  [] PIV [] Central Line [] SCDs []Purewick  [] Dc  []Condom Cath   [] BMS        []  Cortrak   []  Oxymask   [] Bipap    [x] Nasal Canula   [] N/A   [] Other(specify):     Right Ear  [x] WDL                or           [] Skin issue present (please provide assessment/description below)    Left Ear  [x] WDL                or           [] Skin issue present (please provide assessment/description below)    Right Elbow:  [x] WDL               or           [] Skin issue present (please provide assessment/description below)    Left Elbow:   [x] WDL                or           [] Skin issue present (please provide assessment/description below)    Coccyx/Buttocks:  [x] WDL                or           [] Skin issue present (please provide assessment/description below)    Right Heel/Foot/Toes:  [x] WDL                or           [] Skin issue present (please provide assessment/description below)    Left Heel/Foot/Toes:   [x] WDL              or           [] Skin issue present (please provide assessment/description below)      **Describe any other skin issues in areas not already listed from above list:   [] N/A    Interventions In Place: N/A    **If any new or digressing skin issues are found, fill out the selection below.    Possible Skin Injury No  Pictures Uploaded Into Epic: N/A  Wound Consult Placed: N/A  RN Wound Prevention Protocol Ordered: No    What new preventative interventions did you add? N/A

## 2022-05-19 NOTE — ASSESSMENT & PLAN NOTE
This is Sepsis Present on admission  SIRS criteria identified on my evaluation include: Fever, with temperature greater than 101 deg F and Tachycardia, with heart rate greater than 90 BPM  Source is Urine  Sepsis protocol initiated  Fluid resuscitation ordered per protocol  Crystalloid Fluid Administration: Fluid resuscitation ordered per standard protocol - 30 mL/kg per current or ideal body weight  IV antibiotics as appropriate for source of sepsis  Reassessment: I have reassessed the patient's hemodynamic status which has recovered

## 2022-05-19 NOTE — ED NOTES
"RN assisted pt to bathroom, Urine obtained and sent to lab, cloudy yellow with substance/floater in specium.   Randall phoned RN and informed this RN they are unable to run urine specimen due to machine reports error each time due to microscopic \"tissue\" in urine.  Pt has know kidney stone with stent placement.      "

## 2022-05-19 NOTE — ED NOTES
"Pt refused insulin at this time, states \"I need food, my sugar will drop to fast\"   Pt updated, aware breakfast will arrive after 0930, states she will wait til then    "

## 2022-05-19 NOTE — ASSESSMENT & PLAN NOTE
E: Roy[place] related to stent placement  On cefepime given recent hospitalization and stent placement  De-escalate antibiotic as needed  Culture growing E. coli and Proteus  -Will continue tamsulosin  -Urology recommend antibiotics and keeping 6/2/22 surgery date for stent removal and stone treatment, no operative intervention planned as inpatient at this time  -ID to see her today

## 2022-05-20 PROBLEM — R78.81 BACTEREMIA: Status: ACTIVE | Noted: 2022-05-20

## 2022-05-20 LAB
ANION GAP SERPL CALC-SCNC: 11 MMOL/L (ref 7–16)
BACTERIA UR CULT: ABNORMAL
BACTERIA UR CULT: ABNORMAL
BUN SERPL-MCNC: 13 MG/DL (ref 8–22)
CALCIUM SERPL-MCNC: 8.4 MG/DL (ref 8.5–10.5)
CHLORIDE SERPL-SCNC: 105 MMOL/L (ref 96–112)
CO2 SERPL-SCNC: 24 MMOL/L (ref 20–33)
CREAT SERPL-MCNC: 0.98 MG/DL (ref 0.5–1.4)
ERYTHROCYTE [DISTWIDTH] IN BLOOD BY AUTOMATED COUNT: 41.8 FL (ref 35.9–50)
GFR SERPLBLD CREATININE-BSD FMLA CKD-EPI: 64 ML/MIN/1.73 M 2
GLUCOSE BLD STRIP.AUTO-MCNC: 127 MG/DL (ref 65–99)
GLUCOSE BLD STRIP.AUTO-MCNC: 150 MG/DL (ref 65–99)
GLUCOSE BLD STRIP.AUTO-MCNC: 153 MG/DL (ref 65–99)
GLUCOSE BLD STRIP.AUTO-MCNC: 166 MG/DL (ref 65–99)
GLUCOSE SERPL-MCNC: 171 MG/DL (ref 65–99)
HCT VFR BLD AUTO: 32.9 % (ref 37–47)
HGB BLD-MCNC: 10.5 G/DL (ref 12–16)
MCH RBC QN AUTO: 27.1 PG (ref 27–33)
MCHC RBC AUTO-ENTMCNC: 31.9 G/DL (ref 33.6–35)
MCV RBC AUTO: 85 FL (ref 81.4–97.8)
PLATELET # BLD AUTO: 191 K/UL (ref 164–446)
PMV BLD AUTO: 9.6 FL (ref 9–12.9)
POTASSIUM SERPL-SCNC: 3.7 MMOL/L (ref 3.6–5.5)
RBC # BLD AUTO: 3.87 M/UL (ref 4.2–5.4)
SIGNIFICANT IND 70042: ABNORMAL
SITE SITE: ABNORMAL
SODIUM SERPL-SCNC: 140 MMOL/L (ref 135–145)
SOURCE SOURCE: ABNORMAL
WBC # BLD AUTO: 7 K/UL (ref 4.8–10.8)

## 2022-05-20 PROCEDURE — A9270 NON-COVERED ITEM OR SERVICE: HCPCS | Performed by: INTERNAL MEDICINE

## 2022-05-20 PROCEDURE — 700105 HCHG RX REV CODE 258: Performed by: INTERNAL MEDICINE

## 2022-05-20 PROCEDURE — 85027 COMPLETE CBC AUTOMATED: CPT

## 2022-05-20 PROCEDURE — 36415 COLL VENOUS BLD VENIPUNCTURE: CPT

## 2022-05-20 PROCEDURE — 700105 HCHG RX REV CODE 258: Performed by: STUDENT IN AN ORGANIZED HEALTH CARE EDUCATION/TRAINING PROGRAM

## 2022-05-20 PROCEDURE — 770001 HCHG ROOM/CARE - MED/SURG/GYN PRIV*

## 2022-05-20 PROCEDURE — 700102 HCHG RX REV CODE 250 W/ 637 OVERRIDE(OP): Performed by: INTERNAL MEDICINE

## 2022-05-20 PROCEDURE — 700111 HCHG RX REV CODE 636 W/ 250 OVERRIDE (IP): Performed by: INTERNAL MEDICINE

## 2022-05-20 PROCEDURE — 99233 SBSQ HOSP IP/OBS HIGH 50: CPT | Performed by: INTERNAL MEDICINE

## 2022-05-20 PROCEDURE — 82962 GLUCOSE BLOOD TEST: CPT | Mod: 91

## 2022-05-20 PROCEDURE — 700111 HCHG RX REV CODE 636 W/ 250 OVERRIDE (IP): Performed by: STUDENT IN AN ORGANIZED HEALTH CARE EDUCATION/TRAINING PROGRAM

## 2022-05-20 PROCEDURE — 80048 BASIC METABOLIC PNL TOTAL CA: CPT

## 2022-05-20 RX ORDER — KETOROLAC TROMETHAMINE 30 MG/ML
30 INJECTION, SOLUTION INTRAMUSCULAR; INTRAVENOUS ONCE
Status: DISPENSED | OUTPATIENT
Start: 2022-05-20 | End: 2022-05-21

## 2022-05-20 RX ADMIN — CHOLECALCIFEROL TAB 125 MCG (5000 UNIT) 5000 UNITS: 125 TAB at 05:13

## 2022-05-20 RX ADMIN — INSULIN LISPRO 2 UNITS: 100 INJECTION, SOLUTION INTRAVENOUS; SUBCUTANEOUS at 18:36

## 2022-05-20 RX ADMIN — INSULIN GLARGINE-YFGN 50 UNITS: 100 INJECTION, SOLUTION SUBCUTANEOUS at 18:36

## 2022-05-20 RX ADMIN — ACETAMINOPHEN 650 MG: 325 TABLET ORAL at 15:28

## 2022-05-20 RX ADMIN — CEFTRIAXONE SODIUM 2 G: 10 INJECTION, POWDER, FOR SOLUTION INTRAVENOUS at 20:11

## 2022-05-20 RX ADMIN — INSULIN LISPRO 15 UNITS: 100 INJECTION, SOLUTION INTRAVENOUS; SUBCUTANEOUS at 09:25

## 2022-05-20 RX ADMIN — INSULIN LISPRO 15 UNITS: 100 INJECTION, SOLUTION INTRAVENOUS; SUBCUTANEOUS at 18:35

## 2022-05-20 RX ADMIN — SENNOSIDES AND DOCUSATE SODIUM 2 TABLET: 50; 8.6 TABLET ORAL at 05:12

## 2022-05-20 RX ADMIN — OMEPRAZOLE 40 MG: 20 CAPSULE, DELAYED RELEASE ORAL at 05:12

## 2022-05-20 RX ADMIN — CETIRIZINE HYDROCHLORIDE 10 MG: 10 TABLET, FILM COATED ORAL at 05:13

## 2022-05-20 RX ADMIN — CYANOCOBALAMIN TAB 500 MCG 500 MCG: 500 TAB at 05:12

## 2022-05-20 RX ADMIN — ONDANSETRON 4 MG: 4 TABLET, ORALLY DISINTEGRATING ORAL at 00:45

## 2022-05-20 RX ADMIN — INSULIN LISPRO 2 UNITS: 100 INJECTION, SOLUTION INTRAVENOUS; SUBCUTANEOUS at 20:09

## 2022-05-20 RX ADMIN — SODIUM CHLORIDE: 9 INJECTION, SOLUTION INTRAVENOUS at 15:29

## 2022-05-20 RX ADMIN — INSULIN LISPRO 15 UNITS: 100 INJECTION, SOLUTION INTRAVENOUS; SUBCUTANEOUS at 13:15

## 2022-05-20 RX ADMIN — CEFEPIME 2 G: 2 INJECTION, POWDER, FOR SOLUTION INTRAVENOUS at 05:13

## 2022-05-20 RX ADMIN — ATORVASTATIN CALCIUM 40 MG: 40 TABLET, FILM COATED ORAL at 20:11

## 2022-05-20 RX ADMIN — TAMSULOSIN HYDROCHLORIDE 0.4 MG: 0.4 CAPSULE ORAL at 05:12

## 2022-05-20 RX ADMIN — TRIAMTERENE AND HYDROCHLOROTHIAZIDE 1 CAPSULE: 37.5; 25 CAPSULE ORAL at 05:12

## 2022-05-20 RX ADMIN — ONDANSETRON 4 MG: 4 TABLET, ORALLY DISINTEGRATING ORAL at 20:10

## 2022-05-20 RX ADMIN — CEFEPIME 2 G: 2 INJECTION, POWDER, FOR SOLUTION INTRAVENOUS at 13:22

## 2022-05-20 RX ADMIN — SENNOSIDES AND DOCUSATE SODIUM 2 TABLET: 50; 8.6 TABLET ORAL at 18:00

## 2022-05-20 ASSESSMENT — ENCOUNTER SYMPTOMS
VOMITING: 0
SHORTNESS OF BREATH: 0
FOCAL WEAKNESS: 0
EYE PAIN: 0
PALPITATIONS: 0
COUGH: 0
FLANK PAIN: 0
BACK PAIN: 0
BLURRED VISION: 0
FALLS: 0
CHILLS: 1
ABDOMINAL PAIN: 0
NAUSEA: 0
DIZZINESS: 0
HEADACHES: 0
DIARRHEA: 0
SENSORY CHANGE: 0
CHILLS: 0
FEVER: 0
ABDOMINAL PAIN: 1

## 2022-05-20 ASSESSMENT — PAIN DESCRIPTION - PAIN TYPE: TYPE: ACUTE PAIN

## 2022-05-20 NOTE — ASSESSMENT & PLAN NOTE
Culture growing E. coli and Proteus  Likely source: UTI  Plan as above  On IV ceftriaxone  Repeat blood culture pending

## 2022-05-20 NOTE — PROGRESS NOTES
Bedside report received from aman RN.   Pt is A&Ox4, vss, currently on 2L nc.    Pt is up to restroom.    Pt has no complaints of pain, and no immediate needs at this time.     Pt is refusing bed alarm and treaded socks at this time.     Will continue to monitor.

## 2022-05-20 NOTE — DIETARY
NUTRITION SERVICES: BMI - Pt with BMI >40 (=Body mass index is 51.75 kg/m².), Class III obesity. Weight loss counseling not appropriate in acute care setting. RECOMMEND - If appropriate at DC please refer to outpatient nutrition services for weight management.

## 2022-05-20 NOTE — PROGRESS NOTES
Note to reader: this note follows the APSO format rather than the historical SOAP format. Assessment and plan located at the top of the note for ease of use.    Chief Complaint  66 y.o. year old female here with Post-Op Complications (Pt reports having stent placed in right kidney on 5/10 with increased fever, chills, tachy, nausea, diaphoretic beginning today.  )      Assessment/Plan  Interval History   66 y.o. F with urosepsis s/p right ureteral stent placement with Dr. Carver on 5/10.    Plan:  - Continue IV abx as directed by hospitalist  - Urology will still plan on definitive stone surgery 6/2/22  - We will be signing off as no acute surgical intervention is needed at this time. Please contact us with questions.     Patient seen and examined    5/20. Continues to c/o headache and lethargy. WBC trending down now 7. Afebrile. Ucx growing proteus.          Review of Systems  Physical Exam   Review of Systems   Constitutional: Positive for malaise/fatigue. Negative for chills and fever.   Gastrointestinal: Negative for abdominal pain, nausea and vomiting.   Genitourinary: Positive for frequency and urgency. Negative for hematuria.     Vitals:    05/19/22 1535 05/19/22 1925 05/20/22 0335 05/20/22 0656   BP: 123/56 118/58 106/54 110/58   Pulse: 78 82 94 92   Resp: 18 18 18 18   Temp: 36.8 °C (98.2 °F) 37.7 °C (99.8 °F) 37.7 °C (99.8 °F) 37.3 °C (99.1 °F)   TempSrc: Temporal Temporal Temporal Temporal   SpO2: 97% 98% 94% 92%   Weight: 120 kg (264 lb 15.9 oz)      Height:         Physical Exam  Vitals and nursing note reviewed.   Constitutional:       Appearance: Normal appearance.   HENT:      Head: Normocephalic and atraumatic.   Pulmonary:      Effort: Pulmonary effort is normal.   Skin:     General: Skin is warm and dry.   Neurological:      Mental Status: She is alert.   Psychiatric:         Mood and Affect: Mood normal.         Behavior: Behavior normal.          Hematology Chemistry   Lab Results   Component  Value Date/Time    WBC 7.0 05/20/2022 02:37 AM    HEMOGLOBIN 10.5 (L) 05/20/2022 02:37 AM    HEMATOCRIT 32.9 (L) 05/20/2022 02:37 AM    PLATELETCT 191 05/20/2022 02:37 AM     Lab Results   Component Value Date/Time    SODIUM 140 05/20/2022 02:37 AM    POTASSIUM 3.7 05/20/2022 02:37 AM    CHLORIDE 105 05/20/2022 02:37 AM    CO2 24 05/20/2022 02:37 AM    GLUCOSE 171 (H) 05/20/2022 02:37 AM    BUN 13 05/20/2022 02:37 AM    CREATININE 0.98 05/20/2022 02:37 AM         Labs not explicitly included in this progress note were reviewed by the author.   Radiology/imaging not explicitly included in this progress note was reviewed by the author.     Labs reviewed and Medications reviewed

## 2022-05-20 NOTE — CARE PLAN
The patient is Stable - Low risk of patient condition declining or worsening    Shift Goals  Clinical Goals: pt safety  Patient Goals: sleep    Progress made toward(s) clinical / shift goals:  Pt remained free from falls during shift. Pt is refusing bed alarm and socks, educated on the importance of both.      Patient is not progressing towards the following goals:

## 2022-05-20 NOTE — PROGRESS NOTES
Hospital Medicine Daily Progress Note    Date of Service  5/20/2022    Chief Complaint  Ольга Morales is a 66 y.o. female admitted 5/18/2022 with abdominal pain, fever.    Hospital Course  Ольга Morales is a 66 y.o. female who presented 5/18/2022 with Abd pain.  She has not been feeling well since the 10th when she was seen in for a kidney stone and a stent was placed.  Stent had been placed initially because they were unable to extract the stone ou. She was given medications with which it was expected that the stone the stone would pass. She was slated to be seen by urology on June 2 for follow-up.  Since then she has been having abdominal pain on and off which has slowly worsened over the last couple of days. She has felt nauseous she has felt feverish shaking with chills especially over the last 2 days which prompted her to come to the ER today.  She is not having any flank pain any increased urinary frequency above her baseline.      Interval Problem Update  Admitted overnight for sepsis due to UTI, hx recent ureteral stent placement for kidney stone.  Blood culture 5/18 growing GNR's.  Given recent hospital instrumentation, will cover for pseudomonas.  Start cefepime. Follow up culture results.  On IV fluids.  Urology do not plan for operative intervention at this time. They recommend antibiotics and keeping 6/2/22 surgery date for stone treatment and stent removal.    5/20 patient is feeling better.  No acute issue overnight.  Urine culture growing gram-negative rods.  Continue current antibiotic.  I have personally seen and examined the patient at bedside. I discussed the plan of care with patient.    Consultants/Specialty  urology    Code Status  Full Code    Disposition  Patient is not medically cleared for discharge.   Anticipate discharge to to home with close outpatient follow-up.  I have placed the appropriate orders for post-discharge needs.    Review of Systems  Review of Systems    Constitutional: Positive for chills. Negative for fever and malaise/fatigue.   Eyes: Negative for blurred vision and pain.   Respiratory: Negative for cough and shortness of breath.    Cardiovascular: Negative for chest pain, palpitations and leg swelling.   Gastrointestinal: Positive for abdominal pain. Negative for diarrhea, nausea and vomiting.   Genitourinary: Positive for dysuria. Negative for flank pain and urgency.   Musculoskeletal: Negative for back pain and falls.   Skin: Negative for itching and rash.   Neurological: Negative for dizziness, sensory change, focal weakness and headaches.        Physical Exam  Temp:  [36.8 °C (98.2 °F)-37.7 °C (99.8 °F)] 37.3 °C (99.1 °F)  Pulse:  [76-94] 92  Resp:  [18] 18  BP: (106-123)/(54-58) 110/58  SpO2:  [92 %-98 %] 92 %    Physical Exam  Vitals reviewed.   Constitutional:       General: She is not in acute distress.     Appearance: She is obese. She is not diaphoretic.   HENT:      Head: Normocephalic and atraumatic.      Right Ear: External ear normal.      Left Ear: External ear normal.      Nose: Nose normal. No congestion.      Mouth/Throat:      Pharynx: No oropharyngeal exudate or posterior oropharyngeal erythema.   Eyes:      Extraocular Movements: Extraocular movements intact.      Pupils: Pupils are equal, round, and reactive to light.   Cardiovascular:      Rate and Rhythm: Normal rate and regular rhythm.   Pulmonary:      Effort: Pulmonary effort is normal. No respiratory distress.      Breath sounds: Normal breath sounds. No wheezing or rales.   Abdominal:      General: Bowel sounds are normal. There is no distension.      Palpations: Abdomen is soft.      Tenderness: There is abdominal tenderness. There is no guarding or rebound.   Musculoskeletal:         General: No swelling. Normal range of motion.      Cervical back: Normal range of motion and neck supple.      Right lower leg: No edema.      Left lower leg: No edema.   Skin:     General: Skin is  warm and dry.   Neurological:      General: No focal deficit present.      Mental Status: She is alert.         Fluids    Intake/Output Summary (Last 24 hours) at 5/20/2022 1004  Last data filed at 5/20/2022 0900  Gross per 24 hour   Intake 360 ml   Output --   Net 360 ml       Laboratory  Recent Labs     05/18/22  1740 05/19/22  0340 05/20/22  0237   WBC 12.3* 11.6* 7.0   RBC 4.59 4.07* 3.87*   HEMOGLOBIN 12.3 11.2* 10.5*   HEMATOCRIT 38.0 34.2* 32.9*   MCV 82.8 84.0 85.0   MCH 26.8* 27.5 27.1   MCHC 32.4* 32.7* 31.9*   RDW 40.6 42.1 41.8   PLATELETCT 245 206 191   MPV 9.6 9.8 9.6     Recent Labs     05/18/22 1740 05/19/22  0340 05/20/22  0237   SODIUM 136 135 140   POTASSIUM 3.4* 3.3* 3.7   CHLORIDE 101 100 105   CO2 22 24 24   GLUCOSE 164* 170* 171*   BUN 12 13 13   CREATININE 0.78 0.97 0.98   CALCIUM 8.8 8.1* 8.4*                   Imaging  US-RENAL   Final Result         1.  Dilated right renal pelvis, consider extra renal pelvis morphology or mild hydronephrosis.   2.  Echogenic liver, compatible with fatty change versus fibrosis.      [id number] VIEW   Final Result         1.  Moderate stool in the colon suggests changes of constipation, otherwise nonspecific bowel gas pattern   2.  Hepatomegaly      DX-CHEST-PORTABLE (1 VIEW)   Final Result         1.  No acute cardiopulmonary disease.           Assessment/Plan  * Sepsis (HCC)- (present on admission)  Assessment & Plan  This is Sepsis Present on admission  SIRS criteria identified on my evaluation include: Fever, with temperature greater than 101 deg F and Tachycardia, with heart rate greater than 90 BPM  Source is Urine  Sepsis protocol initiated  Fluid resuscitation ordered per protocol  Crystalloid Fluid Administration: Fluid resuscitation ordered per standard protocol - 30 mL/kg per current or ideal body weight  IV antibiotics as appropriate for source of sepsis  Reassessment: I have reassessed the patient's hemodynamic status which has  recovered          Bacteremia  Assessment & Plan  G-rods  Likely source: UTI  Plan as above    Seasonal allergic rhinitis  Assessment & Plan  Will cont cetrizine    GERD (gastroesophageal reflux disease)  Assessment & Plan  Will cont omez      HTN (hypertension)  Assessment & Plan  Will cont home med Trimetrene-HCTZ    CVA (cerebral vascular accident) ([facility])  Assessment & Plan  -Check LDL  -Will cont asa, Atrvastatin    DM (diabetes mellitus) ([facility])  Assessment & Plan  -Will check HBA!c  -Will continue glargine, Prmeal and SSI with lowered dosing      Complicated UTI (urinary tract infection)  Assessment & Plan  E: Likley related to stent placement  On cefepime given recent hospitalization and stent placement  De-escalate antibiotic as needed  Culture growing gram-negative rods  -Will continue tamsulosin  -Urology recommend antibiotics and keeping 6/2/22 surgery date for stent removal and stone treatment, no operative intervention planned as inpatient at this time       VTE prophylaxis: xarelto ppx dose    I have performed a physical exam and reviewed and updated ROS and Plan today (5/20/2022). In review of yesterday's note (5/19/2022), there are no changes except as documented above.

## 2022-05-20 NOTE — PROGRESS NOTES
Patient feeling very fatigued throughout the day. 1410 spiked a slight fever of 37.8, gave tylenol at 1530 and rechecked temp at 1630. Temp is 38.1. Notified Dr. Tate Kaplan to give one time dose 30 mg IV toradol.

## 2022-05-21 LAB
BACTERIA BLD CULT: ABNORMAL
BACTERIA UR CULT: ABNORMAL
BACTERIA UR CULT: ABNORMAL
EKG IMPRESSION: NORMAL
GLUCOSE BLD STRIP.AUTO-MCNC: 110 MG/DL (ref 65–99)
GLUCOSE BLD STRIP.AUTO-MCNC: 118 MG/DL (ref 65–99)
GLUCOSE BLD STRIP.AUTO-MCNC: 121 MG/DL (ref 65–99)
GLUCOSE BLD STRIP.AUTO-MCNC: 135 MG/DL (ref 65–99)
GLUCOSE BLD STRIP.AUTO-MCNC: 162 MG/DL (ref 65–99)
GLUCOSE BLD STRIP.AUTO-MCNC: 69 MG/DL (ref 65–99)
SIGNIFICANT IND 70042: ABNORMAL
SITE SITE: ABNORMAL
SOURCE SOURCE: ABNORMAL

## 2022-05-21 PROCEDURE — 82962 GLUCOSE BLOOD TEST: CPT | Mod: 91

## 2022-05-21 PROCEDURE — 99233 SBSQ HOSP IP/OBS HIGH 50: CPT | Performed by: INTERNAL MEDICINE

## 2022-05-21 PROCEDURE — 700111 HCHG RX REV CODE 636 W/ 250 OVERRIDE (IP): Performed by: INTERNAL MEDICINE

## 2022-05-21 PROCEDURE — 700105 HCHG RX REV CODE 258: Performed by: INTERNAL MEDICINE

## 2022-05-21 PROCEDURE — A9270 NON-COVERED ITEM OR SERVICE: HCPCS | Performed by: INTERNAL MEDICINE

## 2022-05-21 PROCEDURE — 36415 COLL VENOUS BLD VENIPUNCTURE: CPT

## 2022-05-21 PROCEDURE — 93005 ELECTROCARDIOGRAM TRACING: CPT | Performed by: INTERNAL MEDICINE

## 2022-05-21 PROCEDURE — 770001 HCHG ROOM/CARE - MED/SURG/GYN PRIV*

## 2022-05-21 PROCEDURE — 93010 ELECTROCARDIOGRAM REPORT: CPT | Performed by: INTERNAL MEDICINE

## 2022-05-21 PROCEDURE — 700102 HCHG RX REV CODE 250 W/ 637 OVERRIDE(OP): Performed by: INTERNAL MEDICINE

## 2022-05-21 PROCEDURE — 87040 BLOOD CULTURE FOR BACTERIA: CPT

## 2022-05-21 RX ADMIN — SENNOSIDES AND DOCUSATE SODIUM 2 TABLET: 50; 8.6 TABLET ORAL at 06:11

## 2022-05-21 RX ADMIN — ONDANSETRON 4 MG: 4 TABLET, ORALLY DISINTEGRATING ORAL at 13:24

## 2022-05-21 RX ADMIN — SODIUM CHLORIDE: 9 INJECTION, SOLUTION INTRAVENOUS at 18:08

## 2022-05-21 RX ADMIN — INSULIN LISPRO 15 UNITS: 100 INJECTION, SOLUTION INTRAVENOUS; SUBCUTANEOUS at 08:00

## 2022-05-21 RX ADMIN — OMEPRAZOLE 40 MG: 20 CAPSULE, DELAYED RELEASE ORAL at 06:10

## 2022-05-21 RX ADMIN — CETIRIZINE HYDROCHLORIDE 10 MG: 10 TABLET, FILM COATED ORAL at 06:11

## 2022-05-21 RX ADMIN — CHOLECALCIFEROL TAB 125 MCG (5000 UNIT) 5000 UNITS: 125 TAB at 06:10

## 2022-05-21 RX ADMIN — INSULIN GLARGINE-YFGN 50 UNITS: 100 INJECTION, SOLUTION SUBCUTANEOUS at 20:42

## 2022-05-21 RX ADMIN — TAMSULOSIN HYDROCHLORIDE 0.4 MG: 0.4 CAPSULE ORAL at 06:11

## 2022-05-21 RX ADMIN — TRIAMTERENE AND HYDROCHLOROTHIAZIDE 1 CAPSULE: 37.5; 25 CAPSULE ORAL at 06:11

## 2022-05-21 RX ADMIN — CYANOCOBALAMIN TAB 500 MCG 500 MCG: 500 TAB at 06:11

## 2022-05-21 RX ADMIN — ACETAMINOPHEN 650 MG: 325 TABLET ORAL at 13:22

## 2022-05-21 RX ADMIN — CEFTRIAXONE SODIUM 2 G: 10 INJECTION, POWDER, FOR SOLUTION INTRAVENOUS at 20:35

## 2022-05-21 RX ADMIN — ATORVASTATIN CALCIUM 40 MG: 40 TABLET, FILM COATED ORAL at 20:35

## 2022-05-21 ASSESSMENT — ENCOUNTER SYMPTOMS
DIZZINESS: 0
SENSORY CHANGE: 0
FOCAL WEAKNESS: 0
ABDOMINAL PAIN: 1
HEADACHES: 0
BACK PAIN: 0
DIARRHEA: 0
EYE PAIN: 0
NAUSEA: 0
VOMITING: 0
CHILLS: 1
SHORTNESS OF BREATH: 0
FEVER: 1
COUGH: 0
FLANK PAIN: 0
BLURRED VISION: 0
PALPITATIONS: 0

## 2022-05-21 ASSESSMENT — PAIN DESCRIPTION - PAIN TYPE
TYPE: ACUTE PAIN
TYPE: ACUTE PAIN

## 2022-05-21 NOTE — PROGRESS NOTES
Notified Dr. Whipple of low blood sugar of 69, gave juice, after recheck patient 135.  No further orders received.

## 2022-05-21 NOTE — CONSULTS
Not in room at time of rounds  Proteus in urine +stent and stone. resistant  Ecoli in initial blood culture 5/18  Repeat blood culture pending  Check EKG   Continue ceftriaxone for now  Will try to see tomorrow

## 2022-05-21 NOTE — CARE PLAN
The patient is Watcher - Medium risk of patient condition declining or worsening    Shift Goals  Clinical Goals: remain fever free  Patient Goals: sleep    Progress made toward(s) clinical / shift goals:  Pt has remained fever free for the shift.     Patient is not progressing towards the following goals:

## 2022-05-21 NOTE — CONSULTS
INFECTIOUS DISEASES INPATIENT CONSULT NOTE     Date of Service:5/22/2022    Consult Requested By: Samuel Whipple M.D.    Reason for Consultation: UTI  Bacteremia    History of Present Illness:   Ольга Morales is a 66 y.o. female  admitted 5/18/2022 with c/o abdominal pain which had worsened worsened over 2 days prior to admission. Associated with nausea, shaking chills, and subjective fever   s/p R ureteral stent placement on 5/10.   Immediately after procedure she had similar symptoms, but resolved. +urinary urgency and frequency. +stent discomfort.  In the ED she had a low grade fever of 100.5, leukocytosis of 12.3. Normal BP and not tachycardicCr normal 0.97. Urinalysis was nitrite positive and blood cultures show+ Ecoli. She was started on cefepime.   KUB demonstrated right ureteral stent in correct position. Renal US demonstrated mild right hydronephrosis versus extrarenal pelvis.  No pyelo seen  Currently on ceftriaxone  Infectious Diseases consulted for antibiotic recommendation and management  Records reviewed in Care everywhere    Review Of Systems:  Resolved fever  Resolved chills  All other systems are reviewed and are negative     PMH:   Kidney stones  Stent  DM arthritis-eval by Rheum not RA    PSH:  Knee replacement  D and C  TVH  Gallbladder  Rectal fistula repair    FAMILY HX:  EtOH abuse  DM CVA  cancer    SOCIAL HX: no daily EtOH  Social History     Socioeconomic History   • Marital status: Single     Spouse name: Not on file   • Number of children: Not on file   • Years of education: Not on file   • Highest education level: Not on file   Occupational History   • Not on file   Tobacco Use   • Smoking status: Not on file   • Smokeless tobacco: Not on file   Substance and Sexual Activity   • Alcohol use: Not on file   • Drug use: Not on file   • Sexual activity: Not on file   Other Topics Concern   • Not on file   Social History Narrative   • Not on file     Social Determinants of Health      Financial Resource Strain: Not on file   Food Insecurity: Not on file   Transportation Needs: Not on file   Physical Activity: Not on file   Stress: Not on file   Social Connections: Not on file   Intimate Partner Violence: Not on file   Housing Stability: Not on file     Social History     Tobacco Use   Smoking Status Not on file   Smokeless Tobacco Not on file     Social History     Substance and Sexual Activity   Alcohol Use Not on file       Allergies/Intolerances:  Allergies   Allergen Reactions   • Aminoglycosides      itching   • Clindamycin      itching   • Codeine      Itching     • Demerol      n/v   • Lisinopril      cough   • Macrobid [Nitrofurantoin]      hives   • Metformin      diarrhea   • Naltrexone      Bad thoughts   • Oxycontin [Oxycodone]      hallucinations   • Penicillin G      itching   • Pioglitazone      itchy         Other Current Medications:    Current Facility-Administered Medications:   •  cefTRIAXone (Rocephin) syringe 2 g, 2 g, Intravenous, Q24HRS, Samuel Whipple M.D., 2 g at 05/20/22 2011  •  ketorolac (TORADOL) injection 30 mg, 30 mg, Intravenous, Once, Samuel Whipple M.D.  •  oxybutynin (DITROPAN) tablet 5 mg, 5 mg, Oral, TID PRN, Kashmir Burleson M.D.  •  senna-docusate (PERICOLACE or SENOKOT S) 8.6-50 MG per tablet 2 Tablet, 2 Tablet, Oral, BID, 2 Tablet at 05/21/22 0611 **AND** polyethylene glycol/lytes (MIRALAX) PACKET 1 Packet, 1 Packet, Oral, QDAY PRN **AND** magnesium hydroxide (MILK OF MAGNESIA) suspension 30 mL, 30 mL, Oral, QDAY PRN **AND** bisacodyl (DULCOLAX) suppository 10 mg, 10 mg, Rectal, QDAY PRN, Kashmir Burleson M.D.  •  NS infusion, , Intravenous, Continuous, Kashmir Burleson M.D., Last Rate: 125 mL/hr at 05/20/22 1529, New Bag at 05/20/22 1529  •  rivaroxaban (XARELTO) tablet 10 mg, 10 mg, Oral, DAILY, Kashmir Burleson M.D.  •  acetaminophen (Tylenol) tablet 650 mg, 650 mg, Oral, Q6HRS PRN, Kashmir Bulreson M.D., 650 mg at 05/20/22  1528  •  ondansetron (ZOFRAN) syringe/vial injection 4 mg, 4 mg, Intravenous, Q4HRS PRN, Kashmir Burleson M.D.  •  ondansetron (ZOFRAN ODT) dispertab 4 mg, 4 mg, Oral, Q4HRS PRN, Kashmir Burleson M.D., 4 mg at 05/20/22 2010  •  insulin GLARGINE (Lantus,Semglee) injection, 50 Units, Subcutaneous, Q EVENING, 50 Units at 05/20/22 1836 **AND** insulin LISPRO (AdmeLOG,HumaLOG) injection, 15 Units, Subcutaneous, TID AC, 15 Units at 05/21/22 0800 **AND** insulin LISPRO (AdmeLOG,HumaLOG) injection, 2-12 Units, Subcutaneous, 4X/DAY ACHS, 2 Units at 05/20/22 2009 **AND** POC blood glucose manual result, , , Q AC AND BEDTIME(S) **AND** NOTIFY MD and PharmD, , , Once **AND** Administer 20 grams of glucose (approximately 8 ounces of fruit juice) every 15 minutes PRN FSBG less than 70 mg/dL, , , PRN **AND** dextrose 50% (D50W) injection 25 g, 25 g, Intravenous, Q15 MIN PRN, Kashmir Burleson M.D.  •  atorvastatin (LIPITOR) tablet 40 mg, 40 mg, Oral, QHS, Kashmir Burleson M.D., 40 mg at 05/20/22 2011  •  cetirizine (ZYRTEC) tablet 10 mg, 10 mg, Oral, DAILY, Kashmir Burleson M.D., 10 mg at 05/21/22 0611  •  vitamin D3 (cholecalciferol) tablet 5,000 Units, 5,000 Units, Oral, DAILY, Kashmir Burleson M.D., 5,000 Units at 05/21/22 0610  •  cyanocobalamin (VITAMIN B-12) tablet 500 mcg, 500 mcg, Oral, DAILY, Kashmir Burleson M.D., 500 mcg at 05/21/22 0611  •  HYDROcodone-acetaminophen (NORCO) 5-325 MG per tablet 1 Tablet, 1 Tablet, Oral, Q8HRS PRN, Kashmir Burleson M.D.  •  omeprazole (PRILOSEC) capsule 40 mg, 40 mg, Oral, DAILY, Kashmir Burleson M.D., 40 mg at 05/21/22 0610  •  tamsulosin (FLOMAX) capsule 0.4 mg, 0.4 mg, Oral, DAILY, Kashmir Burleson M.D., 0.4 mg at 05/21/22 0611  •  triamterene/hctz (MAXZIDE-25/DYAZIDE) 37.5-25 MG 1 Capsule, 1 Capsule, Oral, DAILY, Kashmir Burleson M.D., 1 Capsule at 05/21/22 0611      Most Recent Vital Signs:  /45   Pulse 71    "Temp 36.9 °C (98.5 °F) (Temporal)   Resp 16   Ht 1.524 m (5')   Wt 120 kg (264 lb 15.9 oz)   SpO2 97%   BMI 51.75 kg/m²   Temp  Av.5 °C (99.5 °F)  Min: 36.8 °C (98.2 °F)  Max: 38.1 °C (100.6 °F)    Physical Exam:  General: well-appearing, well nourished no acute distress Up to chair  HEENT: NCAT, PERRLA, sclera anicteric, PERRL, EOMI,  no oral lesions   Neck: supple, no lymphadenopathy  Chest: CTAB, unlabored.No wheeze or rhonchi   Cardiac: RRR,  no m/r/g   Abdomen: + bowel sounds, soft, non-tender, non-distended  Extremities: No cyanosis, clubbing. no edema,  Skin: no rashes   Neuro: Alert and oriented times 3, Speech fluent CN intact MONTALVO  Psych: Mood normal Affect normal    Pertinent Lab Results:  Recent Labs     22  1740 22  0340 22  0237   WBC 12.3* 11.6* 7.0      Recent Labs     22  0340 22  0237   HEMOGLOBIN 12.3 11.2* 10.5*   HEMATOCRIT 38.0 34.2* 32.9*   MCV 82.8 84.0 85.0   MCH 26.8* 27.5 27.1   PLATELETCT 245 206 191         Recent Labs     22  1740 22  0340 22  0237   SODIUM 136 135 140   POTASSIUM 3.4* 3.3* 3.7   CHLORIDE 101 100 105   CO2 22 24 24   CREATININE 0.78 0.97 0.98        Recent Labs     22   ALBUMIN 3.7        Pertinent Micro:  Results     Procedure Component Value Units Date/Time    BLOOD CULTURE [[id number]] Collected: 22 1001    Order Status: Sent Specimen: Blood from Peripheral Updated: 22 1114    Narrative:      Per Hospital Policy: Only change Specimen Src: to \"Line\" if  specified by physician order.    BLOOD CULTURE [[id number]] Collected: 22 1040    Order Status: Sent Specimen: Blood from Peripheral Updated: 22 1114    Narrative:      Per Hospital Policy: Only change Specimen Src: to \"Line\" if  specified by physician order.    BLOOD CULTURE [[id number]]  (Abnormal)  (Susceptibility) Collected: 22 1740    Order Status: Completed Specimen: Blood from Peripheral Updated: " "05/21/22 0814     Significant Indicator POS     Source BLD     Site PERIPHERAL     Culture Result Growth detected by Bactec instrument. 05/19/2022  08:02      Escherichia coli    Narrative:      CALL  Jo  ER tel. ,  CALLED  ER tel.  05/19/2022, 08:05, RB PERF. RESULTS CALLED TO: [id number] RN  Per Hospital Policy: Only change Specimen Src: to \"Line\" if  specified by physician order.  Right Hand    Susceptibility     Escherichia coli (1)     Antibiotic Interpretation Microscan   Method Status    Ampicillin Sensitive <=8 mcg/mL ARASELI Final    Ceftriaxone Sensitive <=1 mcg/mL ARASELI Final    Cefazolin Sensitive <=2 mcg/mL ARASELI Final    Ciprofloxacin Sensitive <=0.25 mcg/mL ARASELI Final    Cefepime Sensitive <=2 mcg/mL ARASELI Final    Cefuroxime Sensitive <=4 mcg/mL ARASELI Final    Ampicillin/sulbactam Sensitive <=4/2 mcg/mL ARASELI Final    Ertapenem Sensitive <=0.5 mcg/mL ARASELI Final    Tobramycin Sensitive <=2 mcg/mL ARASELI Final    Gentamicin Sensitive <=2 mcg/mL ARASELI Final    Minocycline Sensitive <=4 mcg/mL ARASELI Final    Moxifloxacin Sensitive <=2 mcg/mL ARASELI Final    Pip/Tazobactam Sensitive <=8 mcg/mL ARASELI Final    Trimeth/Sulfa Sensitive <=0.5/9.5 mcg/mL ARASELI Final    Tigecycline Sensitive <=2 mcg/mL ARASELI Final                   BLOOD CULTURE [[id number]]  (Abnormal) Collected: 05/18/22 1740    Order Status: Completed Specimen: Blood from Peripheral Updated: 05/21/22 0814     Significant Indicator POS     Source BLD     Site PERIPHERAL     Culture Result Growth detected by Bactec instrument. 05/19/2022  09:40      Escherichia coli  See previous culture for sensitivity report.      Narrative:      CALL  Jo  ER tel. ,  CALLED  ER tel.  05/19/2022, 09:41, RB PERF. RESULTS CALLED TO: [id number] RN  Per Hospital Policy: Only change Specimen Src: to \"Line\" if  specified by physician order.  Left Hand    URINE CULTURE(NEW) [[id number]]  (Abnormal)  (Susceptibility) Collected: 05/18/22 1735    Order Status: Completed Specimen: Urine Updated: 05/20/22 1051 "     Significant Indicator POS     Source UR     Site -     Culture Result -      Proteus mirabilis  10-50,000 cfu/mL      Narrative:      Indication for culture:->Evaluation for sepsis without a  clear source of infection  Indication for culture:->Evaluation for sepsis without a    Susceptibility     Proteus mirabilis (1)     Antibiotic Interpretation Microscan   Method Status    Ampicillin Resistant >16 mcg/mL ARASELI Final    Ceftriaxone Sensitive <=1 mcg/mL ARASELI Final    Cefazolin Sensitive <=2 mcg/mL ARASELI Final     Breakpoints when Cefazolin is used for therapy of infections  other than uncomplicated UTIs due to Enterobacterales are as  follows:  ARASELI and Interpretation:  <=2 S  4 I  >=8 R         Ciprofloxacin Resistant >2 mcg/mL ARASELI Final    Cefepime Sensitive <=2 mcg/mL ARASELI Final    Cefuroxime Sensitive <=4 mcg/mL ARASELI Final    Tobramycin Intermediate 8 mcg/mL ARASELI Final    Nitrofurantoin Resistant >64 mcg/mL ARASELI Final    Gentamicin Resistant >8 mcg/mL ARASELI Final    Levofloxacin Resistant 4 mcg/mL ARASELI Final    Minocycline Resistant >8 mcg/mL ARASELI Final    Pip/Tazobactam Sensitive <=8 mcg/mL ARASELI Final    Trimeth/Sulfa Resistant >2/38 mcg/mL ARASELI Final                   URINALYSIS (UA) [[id number]]  (Abnormal) Collected: 05/18/22 2035    Order Status: Completed Specimen: Urine Updated: 05/18/22 2129     Color Yellow     Character Cloudy     Specific Gravity 1.012     Ph 6.5     Glucose Negative mg/dL      Ketones Negative mg/dL      Protein 100 mg/dL      Bilirubin Negative     Urobilinogen, Urine 0.2     Nitrite Positive     Leukocyte Esterase Large     Occult Blood Moderate     Micro Urine Req Microscopic    URINALYSIS [[id number]] Collected: 05/18/22 1735    Order Status: Canceled Specimen: Urine         No results found for: BLOODCULTU, BLDCULT, BCHOLD     Studies:  EKG   IMPRESSION:   UTI proteus, complicated  Stent in place  Nephrolithiasis  Ecoli bacteremia  Mult drug allergies    PLAN:   Afebrile  Chills  resolved  Leukocytosis resolved  No pyelo on imaging  No clear source Ecoli-likely urine  Repeat blood cultures ordered  Checked QTC on EKG-good candidate for quinolone switch if plan for discharge  Use Levofloxacin 750 mg PO daily  Will need 7 days antibiotics for treatment of bacteremia-stop date 5/24  Due to stent will need suppression until urologic procedure on 6/2/2022-limited options due to Proteus drug resistance. Use Keflex 500 mg PO BID      Plan of care discussed with KRISTIN Whipple M.D.. Will continue to follow    Raya Delgado M.D.

## 2022-05-21 NOTE — PROGRESS NOTES
Hospital Medicine Daily Progress Note    Date of Service  5/21/2022    Chief Complaint  Ольга Morales is a 66 y.o. female admitted 5/18/2022 with abdominal pain, fever.    Hospital Course  Ольга Morales is a 66 y.o. female who presented 5/18/2022 with Abd pain.  She has not been feeling well since the 10th when she was seen in for a kidney stone and a stent was placed.  Stent had been placed initially because they were unable to extract the stone ou. She was given medications with which it was expected that the stone the stone would pass. She was slated to be seen by urology on June 2 for follow-up.  Since then she has been having abdominal pain on and off which has slowly worsened over the last couple of days. She has felt nauseous she has felt feverish shaking with chills especially over the last 2 days which prompted her to come to the ER today.  She is not having any flank pain any increased urinary frequency above her baseline.      Interval Problem Update  Admitted overnight for sepsis due to UTI, hx recent ureteral stent placement for kidney stone.  Blood culture 5/18 growing GNR's.  Given recent hospital instrumentation, will cover for pseudomonas.  Start cefepime. Follow up culture results.  On IV fluids.  Urology do not plan for operative intervention at this time. They recommend antibiotics and keeping 6/2/22 surgery date for stone treatment and stent removal.    5/20 patient is feeling better.  No acute issue overnight.  Urine culture growing gram-negative rods.  Continue current antibiotic.    5/21: The patient has intermittent fever overnight.  Blood culture growing E. coli and Proteus.  Repeat blood culture is pending.  I will consult ID today.  I have personally seen and examined the patient at bedside. I discussed the plan of care with patient.    Consultants/Specialty  urology    Code Status  Full Code    Disposition  Patient is not medically cleared for discharge.   Anticipate discharge to  to home with close outpatient follow-up.  I have placed the appropriate orders for post-discharge needs.    Review of Systems  Review of Systems   Constitutional: Positive for chills and fever. Negative for malaise/fatigue.   Eyes: Negative for blurred vision and pain.   Respiratory: Negative for cough and shortness of breath.    Cardiovascular: Negative for chest pain, palpitations and leg swelling.   Gastrointestinal: Positive for abdominal pain. Negative for diarrhea, nausea and vomiting.   Genitourinary: Negative for dysuria, flank pain and urgency.   Musculoskeletal: Negative for back pain.   Skin: Negative for itching and rash.   Neurological: Negative for dizziness, sensory change, focal weakness and headaches.        Physical Exam  Temp:  [36.9 °C (98.5 °F)-38.1 °C (100.6 °F)] 36.9 °C (98.5 °F)  Pulse:  [71-86] 71  Resp:  [16-18] 16  BP: (116-128)/(45-62) 128/45  SpO2:  [92 %-97 %] 97 %    Physical Exam  Vitals reviewed.   Constitutional:       General: She is not in acute distress.     Appearance: She is obese.   HENT:      Head: Normocephalic and atraumatic.      Right Ear: External ear normal.      Left Ear: External ear normal.      Nose: Nose normal. No congestion.      Mouth/Throat:      Pharynx: No oropharyngeal exudate or posterior oropharyngeal erythema.   Eyes:      Extraocular Movements: Extraocular movements intact.      Pupils: Pupils are equal, round, and reactive to light.   Cardiovascular:      Rate and Rhythm: Normal rate and regular rhythm.   Pulmonary:      Effort: Pulmonary effort is normal. No respiratory distress.      Breath sounds: Normal breath sounds. No wheezing or rales.   Abdominal:      General: Bowel sounds are normal. There is no distension.      Palpations: Abdomen is soft.      Tenderness: There is abdominal tenderness.   Musculoskeletal:         General: No swelling. Normal range of motion.      Cervical back: Normal range of motion and neck supple.      Right lower leg: No  edema.      Left lower leg: No edema.   Skin:     General: Skin is warm and dry.   Neurological:      General: No focal deficit present.      Mental Status: She is alert.         Fluids    Intake/Output Summary (Last 24 hours) at 5/21/2022 0854  Last data filed at 5/20/2022 1800  Gross per 24 hour   Intake 1620 ml   Output --   Net 1620 ml       Laboratory  Recent Labs     05/18/22  1740 05/19/22  0340 05/20/22  0237   WBC 12.3* 11.6* 7.0   RBC 4.59 4.07* 3.87*   HEMOGLOBIN 12.3 11.2* 10.5*   HEMATOCRIT 38.0 34.2* 32.9*   MCV 82.8 84.0 85.0   MCH 26.8* 27.5 27.1   MCHC 32.4* 32.7* 31.9*   RDW 40.6 42.1 41.8   PLATELETCT 245 206 191   MPV 9.6 9.8 9.6     Recent Labs     05/18/22 1740 05/19/22  0340 05/20/22  0237   SODIUM 136 135 140   POTASSIUM 3.4* 3.3* 3.7   CHLORIDE 101 100 105   CO2 22 24 24   GLUCOSE 164* 170* 171*   BUN 12 13 13   CREATININE 0.78 0.97 0.98   CALCIUM 8.8 8.1* 8.4*                   Imaging  US-RENAL   Final Result         1.  Dilated right renal pelvis, consider extra renal pelvis morphology or mild hydronephrosis.   2.  Echogenic liver, compatible with fatty change versus fibrosis.      [id number] VIEW   Final Result         1.  Moderate stool in the colon suggests changes of constipation, otherwise nonspecific bowel gas pattern   2.  Hepatomegaly      DX-CHEST-PORTABLE (1 VIEW)   Final Result         1.  No acute cardiopulmonary disease.           Assessment/Plan  * Sepsis (HCC)- (present on admission)  Assessment & Plan  This is Sepsis Present on admission  SIRS criteria identified on my evaluation include: Fever, with temperature greater than 101 deg F and Tachycardia, with heart rate greater than 90 BPM  Source is Urine  Sepsis protocol initiated  Fluid resuscitation ordered per protocol  Crystalloid Fluid Administration: Fluid resuscitation ordered per standard protocol - 30 mL/kg per current or ideal body weight  IV antibiotics as appropriate for source of sepsis  Reassessment: I have  reassessed the patient's hemodynamic status which has recovered          Bacteremia  Assessment & Plan  Culture growing E. coli and Proteus  Likely source: UTI  Plan as above  On IV ceftriaxone  Repeat blood culture pending    Seasonal allergic rhinitis  Assessment & Plan  Will cont cetrizine    GERD (gastroesophageal reflux disease)  Assessment & Plan  Will cont omez      HTN (hypertension)  Assessment & Plan  Will cont home med Trimetrene-HCTZ    CVA (cerebral vascular accident) ([facility])  Assessment & Plan  -Check LDL  -Will cont asa, Atrvastatin    DM (diabetes mellitus) ([facility])  Assessment & Plan  -Will check HBA!c  -Will continue glargine, Prmeal and SSI with lowered dosing      Complicated UTI (urinary tract infection)  Assessment & Plan  E: Likley related to stent placement  On cefepime given recent hospitalization and stent placement  De-escalate antibiotic as needed  Culture growing E. coli and Proteus  -Will continue tamsulosin  -Urology recommend antibiotics and keeping 6/2/22 surgery date for stent removal and stone treatment, no operative intervention planned as inpatient at this time  -Consult ID today       VTE prophylaxis: xarelto ppx dose    I have performed a physical exam and reviewed and updated ROS and Plan today (5/21/2022). In review of yesterday's note (5/20/2022), there are no changes except as documented above.

## 2022-05-22 LAB
BASOPHILS # BLD AUTO: 0.7 % (ref 0–1.8)
BASOPHILS # BLD: 0.04 K/UL (ref 0–0.12)
EOSINOPHIL # BLD AUTO: 0.31 K/UL (ref 0–0.51)
EOSINOPHIL NFR BLD: 5.2 % (ref 0–6.9)
ERYTHROCYTE [DISTWIDTH] IN BLOOD BY AUTOMATED COUNT: 39.8 FL (ref 35.9–50)
GLUCOSE BLD STRIP.AUTO-MCNC: 111 MG/DL (ref 65–99)
GLUCOSE BLD STRIP.AUTO-MCNC: 113 MG/DL (ref 65–99)
GLUCOSE BLD STRIP.AUTO-MCNC: 142 MG/DL (ref 65–99)
GLUCOSE BLD STRIP.AUTO-MCNC: 175 MG/DL (ref 65–99)
HCT VFR BLD AUTO: 31.5 % (ref 37–47)
HGB BLD-MCNC: 10.1 G/DL (ref 12–16)
IMM GRANULOCYTES # BLD AUTO: 0.02 K/UL (ref 0–0.11)
IMM GRANULOCYTES NFR BLD AUTO: 0.3 % (ref 0–0.9)
LYMPHOCYTES # BLD AUTO: 1.29 K/UL (ref 1–4.8)
LYMPHOCYTES NFR BLD: 21.6 % (ref 22–41)
MCH RBC QN AUTO: 27 PG (ref 27–33)
MCHC RBC AUTO-ENTMCNC: 32.1 G/DL (ref 33.6–35)
MCV RBC AUTO: 84.2 FL (ref 81.4–97.8)
MONOCYTES # BLD AUTO: 0.65 K/UL (ref 0–0.85)
MONOCYTES NFR BLD AUTO: 10.9 % (ref 0–13.4)
NEUTROPHILS # BLD AUTO: 3.67 K/UL (ref 2–7.15)
NEUTROPHILS NFR BLD: 61.3 % (ref 44–72)
NRBC # BLD AUTO: 0 K/UL
NRBC BLD-RTO: 0 /100 WBC
PLATELET # BLD AUTO: 186 K/UL (ref 164–446)
PMV BLD AUTO: 9.7 FL (ref 9–12.9)
RBC # BLD AUTO: 3.74 M/UL (ref 4.2–5.4)
WBC # BLD AUTO: 6 K/UL (ref 4.8–10.8)

## 2022-05-22 PROCEDURE — 85025 COMPLETE CBC W/AUTO DIFF WBC: CPT

## 2022-05-22 PROCEDURE — 82962 GLUCOSE BLOOD TEST: CPT

## 2022-05-22 PROCEDURE — 99223 1ST HOSP IP/OBS HIGH 75: CPT | Performed by: INTERNAL MEDICINE

## 2022-05-22 PROCEDURE — 700101 HCHG RX REV CODE 250: Performed by: INTERNAL MEDICINE

## 2022-05-22 PROCEDURE — 36415 COLL VENOUS BLD VENIPUNCTURE: CPT

## 2022-05-22 PROCEDURE — A9270 NON-COVERED ITEM OR SERVICE: HCPCS | Performed by: INTERNAL MEDICINE

## 2022-05-22 PROCEDURE — 700111 HCHG RX REV CODE 636 W/ 250 OVERRIDE (IP): Performed by: INTERNAL MEDICINE

## 2022-05-22 PROCEDURE — 99232 SBSQ HOSP IP/OBS MODERATE 35: CPT | Performed by: INTERNAL MEDICINE

## 2022-05-22 PROCEDURE — 700102 HCHG RX REV CODE 250 W/ 637 OVERRIDE(OP): Performed by: INTERNAL MEDICINE

## 2022-05-22 PROCEDURE — 770001 HCHG ROOM/CARE - MED/SURG/GYN PRIV*

## 2022-05-22 PROCEDURE — 700105 HCHG RX REV CODE 258: Performed by: INTERNAL MEDICINE

## 2022-05-22 RX ADMIN — INSULIN LISPRO 15 UNITS: 100 INJECTION, SOLUTION INTRAVENOUS; SUBCUTANEOUS at 09:27

## 2022-05-22 RX ADMIN — INSULIN LISPRO 2 UNITS: 100 INJECTION, SOLUTION INTRAVENOUS; SUBCUTANEOUS at 12:19

## 2022-05-22 RX ADMIN — OXYBUTYNIN CHLORIDE 5 MG: 5 TABLET ORAL at 19:10

## 2022-05-22 RX ADMIN — CYANOCOBALAMIN TAB 500 MCG 500 MCG: 500 TAB at 04:17

## 2022-05-22 RX ADMIN — INSULIN LISPRO 15 UNITS: 100 INJECTION, SOLUTION INTRAVENOUS; SUBCUTANEOUS at 12:19

## 2022-05-22 RX ADMIN — ATORVASTATIN CALCIUM 40 MG: 40 TABLET, FILM COATED ORAL at 20:47

## 2022-05-22 RX ADMIN — TAMSULOSIN HYDROCHLORIDE 0.4 MG: 0.4 CAPSULE ORAL at 04:17

## 2022-05-22 RX ADMIN — CEFTRIAXONE SODIUM 2 G: 10 INJECTION, POWDER, FOR SOLUTION INTRAVENOUS at 20:47

## 2022-05-22 RX ADMIN — CETIRIZINE HYDROCHLORIDE 10 MG: 10 TABLET, FILM COATED ORAL at 04:17

## 2022-05-22 RX ADMIN — OMEPRAZOLE 40 MG: 20 CAPSULE, DELAYED RELEASE ORAL at 04:17

## 2022-05-22 RX ADMIN — INSULIN GLARGINE-YFGN 50 UNITS: 100 INJECTION, SOLUTION SUBCUTANEOUS at 17:54

## 2022-05-22 RX ADMIN — SODIUM CHLORIDE: 9 INJECTION, SOLUTION INTRAVENOUS at 03:00

## 2022-05-22 RX ADMIN — CHOLECALCIFEROL TAB 125 MCG (5000 UNIT) 5000 UNITS: 125 TAB at 04:18

## 2022-05-22 RX ADMIN — TRIAMTERENE AND HYDROCHLOROTHIAZIDE 1 CAPSULE: 37.5; 25 CAPSULE ORAL at 04:17

## 2022-05-22 RX ADMIN — INSULIN LISPRO 15 UNITS: 100 INJECTION, SOLUTION INTRAVENOUS; SUBCUTANEOUS at 17:54

## 2022-05-22 ASSESSMENT — ENCOUNTER SYMPTOMS
FLANK PAIN: 0
BLURRED VISION: 0
SHORTNESS OF BREATH: 0
BACK PAIN: 0
PALPITATIONS: 0
COUGH: 0
DIARRHEA: 0
CHILLS: 1
FEVER: 1
ABDOMINAL PAIN: 1
FOCAL WEAKNESS: 0
SENSORY CHANGE: 0
HEADACHES: 0
VOMITING: 0
EYE PAIN: 0

## 2022-05-22 ASSESSMENT — PAIN DESCRIPTION - PAIN TYPE
TYPE: ACUTE PAIN

## 2022-05-22 NOTE — CARE PLAN
Problem: Fall Risk  Goal: Patient will remain free from falls  Outcome: Progressing  Note: Patient educated on fall risk, safety, call light use, and bed alarm prn     Problem: Pain - Standard  Goal: Alleviation of pain or a reduction in pain to the patient’s comfort goal  Outcome: Progressing  Note: Pain assessed qshift and as needed

## 2022-05-22 NOTE — CARE PLAN
Problem: Knowledge Deficit - Standard  Goal: Patient and family/care givers will demonstrate understanding of plan of care, disease process/condition, diagnostic tests and medications  Outcome: Progressing     Problem: Fall Risk  Goal: Patient will remain free from falls  Outcome: Progressing     Problem: Pain - Standard  Goal: Alleviation of pain or a reduction in pain to the patient’s comfort goal  Outcome: Progressing     The patient is Stable - Low risk of patient condition declining or worsening    Shift Goals  Clinical Goals: monitor blood sugars and infection  Patient Goals: rest, infection control    Progress made toward(s) clinical / shift goals:  yes    Patient is not progressing towards the following goals:

## 2022-05-22 NOTE — PROGRESS NOTES
Hospital Medicine Daily Progress Note    Date of Service  5/22/2022    Chief Complaint  Ольга Morales is a 66 y.o. female admitted 5/18/2022 with abdominal pain, fever.    Hospital Course  Ольга Morales is a 66 y.o. female who presented 5/18/2022 with Abd pain.  She has not been feeling well since the 10th when she was seen in for a kidney stone and a stent was placed.  Stent had been placed initially because they were unable to extract the stone ou. She was given medications with which it was expected that the stone the stone would pass. She was slated to be seen by urology on June 2 for follow-up.  Since then she has been having abdominal pain on and off which has slowly worsened over the last couple of days. She has felt nauseous she has felt feverish shaking with chills especially over the last 2 days which prompted her to come to the ER today.  She is not having any flank pain any increased urinary frequency above her baseline.      Interval Problem Update  Admitted overnight for sepsis due to UTI, hx recent ureteral stent placement for kidney stone.  Blood culture 5/18 growing GNR's.  Given recent hospital instrumentation, will cover for pseudomonas.  Start cefepime. Follow up culture results.  On IV fluids.  Urology do not plan for operative intervention at this time. They recommend antibiotics and keeping 6/2/22 surgery date for stone treatment and stent removal.    5/20 patient is feeling better.  No acute issue overnight.  Urine culture growing gram-negative rods.  Continue current antibiotic.    5/21: The patient has intermittent fever overnight.  Blood culture growing E. coli and Proteus.  Repeat blood culture is pending.  I will consult ID today.    5/22 no acute event overnight.  ID to see her today.  Continue antibiotic.  I have personally seen and examined the patient at bedside. I discussed the plan of care with patient.    Consultants/Specialty  urology    Code Status  Full  Code    Disposition  Patient is not medically cleared for discharge.   Anticipate discharge to to home with close outpatient follow-up.  I have placed the appropriate orders for post-discharge needs.    Review of Systems  Review of Systems   Constitutional: Positive for chills and fever. Negative for malaise/fatigue.   Eyes: Negative for blurred vision and pain.   Respiratory: Negative for cough and shortness of breath.    Cardiovascular: Negative for palpitations and leg swelling.   Gastrointestinal: Positive for abdominal pain. Negative for diarrhea and vomiting.   Genitourinary: Negative for dysuria, flank pain and urgency.   Musculoskeletal: Negative for back pain.   Skin: Negative for itching and rash.   Neurological: Negative for sensory change, focal weakness and headaches.        Physical Exam  Temp:  [36.4 °C (97.6 °F)-37.2 °C (99 °F)] 36.4 °C (97.6 °F)  Pulse:  [65-78] 65  Resp:  [18] 18  BP: (105-145)/(59) 105/59  SpO2:  [94 %-98 %] 98 %    Physical Exam  Vitals reviewed.   Constitutional:       General: She is not in acute distress.     Appearance: She is obese.   HENT:      Head: Normocephalic and atraumatic.      Right Ear: External ear normal.      Left Ear: External ear normal.      Nose: Nose normal. No congestion.      Mouth/Throat:      Pharynx: No oropharyngeal exudate or posterior oropharyngeal erythema.   Eyes:      Extraocular Movements: Extraocular movements intact.      Pupils: Pupils are equal, round, and reactive to light.   Cardiovascular:      Rate and Rhythm: Normal rate and regular rhythm.   Pulmonary:      Effort: Pulmonary effort is normal. No respiratory distress.      Breath sounds: Normal breath sounds.   Abdominal:      General: Bowel sounds are normal.      Palpations: Abdomen is soft.      Tenderness: There is abdominal tenderness.   Musculoskeletal:         General: No swelling. Normal range of motion.      Cervical back: Normal range of motion and neck supple.   Skin:      General: Skin is warm and dry.   Neurological:      General: No focal deficit present.      Mental Status: She is alert.         Fluids    Intake/Output Summary (Last 24 hours) at 5/22/2022 0751  Last data filed at 5/21/2022 2100  Gross per 24 hour   Intake 250 ml   Output --   Net 250 ml       Laboratory  Recent Labs     05/20/22  0237 05/22/22  0342   WBC 7.0 6.0   RBC 3.87* 3.74*   HEMOGLOBIN 10.5* 10.1*   HEMATOCRIT 32.9* 31.5*   MCV 85.0 84.2   MCH 27.1 27.0   MCHC 31.9* 32.1*   RDW 41.8 39.8   PLATELETCT 191 186   MPV 9.6 9.7     Recent Labs     05/20/22  0237   SODIUM 140   POTASSIUM 3.7   CHLORIDE 105   CO2 24   GLUCOSE 171*   BUN 13   CREATININE 0.98   CALCIUM 8.4*                   Imaging  US-RENAL   Final Result         1.  Dilated right renal pelvis, consider extra renal pelvis morphology or mild hydronephrosis.   2.  Echogenic liver, compatible with fatty change versus fibrosis.      [id number] VIEW   Final Result         1.  Moderate stool in the colon suggests changes of constipation, otherwise nonspecific bowel gas pattern   2.  Hepatomegaly      DX-CHEST-PORTABLE (1 VIEW)   Final Result         1.  No acute cardiopulmonary disease.           Assessment/Plan  * Sepsis (HCC)- (present on admission)  Assessment & Plan  This is Sepsis Present on admission  SIRS criteria identified on my evaluation include: Fever, with temperature greater than 101 deg F and Tachycardia, with heart rate greater than 90 BPM  Source is Urine  Sepsis protocol initiated  Fluid resuscitation ordered per protocol  Crystalloid Fluid Administration: Fluid resuscitation ordered per standard protocol - 30 mL/kg per current or ideal body weight  IV antibiotics as appropriate for source of sepsis  Reassessment: I have reassessed the patient's hemodynamic status which has recovered          Bacteremia  Assessment & Plan  Culture growing E. coli and Proteus  Likely source: UTI  Plan as above  On IV ceftriaxone  Repeat blood culture  pending    Seasonal allergic rhinitis  Assessment & Plan  Will cont cetrizine    GERD (gastroesophageal reflux disease)  Assessment & Plan  Will cont omez      HTN (hypertension)  Assessment & Plan  Will cont home med Trimetrene-HCTZ    CVA (cerebral vascular accident) ([facility])  Assessment & Plan  -Check LDL  -Will cont asa, Atrvastatin    DM (diabetes mellitus) ([facility])  Assessment & Plan  -Will check HBA!c  -Will continue glargine, Prmeal and SSI with lowered dosing      Complicated UTI (urinary tract infection)  Assessment & Plan  E: Likley related to stent placement  On cefepime given recent hospitalization and stent placement  De-escalate antibiotic as needed  Culture growing E. coli and Proteus  -Will continue tamsulosin  -Urology recommend antibiotics and keeping 6/2/22 surgery date for stent removal and stone treatment, no operative intervention planned as inpatient at this time  -ID to see her today       VTE prophylaxis: xarelto ppx dose    I have performed a physical exam and reviewed and updated ROS and Plan today (5/22/2022). In review of yesterday's note (5/21/2022), there are no changes except as documented above.

## 2022-05-22 NOTE — PROGRESS NOTES
Received bedside report from night shift RN.   Assumed care of patient at change of shift.   Assessment complete and POC discussed.   STATUS: Patient is A&Ox4, VSS, on RA.   PAIN: Patient denies pain, no apparent signs of distress or discomfort.   Patient is sitting up in bed for breakfast.   Bed alarm set, call light in reach.  Bed is in lowest/locked position.   Call light and belongings are within reach.   No further needs at this time.  Will continue to monitor.

## 2022-05-22 NOTE — CARE PLAN
The patient is Stable - Low risk of patient condition declining or worsening    Shift Goals  Clinical Goals: monitor blood sugars, labs  Patient Goals: rest    Progress made toward(s) clinical / shift goals:  yes      Problem: Knowledge Deficit - Standard  Goal: Patient and family/care givers will demonstrate understanding of plan of care, disease process/condition, diagnostic tests and medications  Outcome: Progressing     Problem: Fall Risk  Goal: Patient will remain free from falls  Outcome: Progressing     Problem: Pain - Standard  Goal: Alleviation of pain or a reduction in pain to the patient’s comfort goal  Outcome: Progressing       Patient is not progressing towards the following goals:

## 2022-05-23 ENCOUNTER — PHARMACY VISIT (OUTPATIENT)
Dept: PHARMACY | Facility: MEDICAL CENTER | Age: 67
End: 2022-05-23
Payer: COMMERCIAL

## 2022-05-23 VITALS
DIASTOLIC BLOOD PRESSURE: 59 MMHG | SYSTOLIC BLOOD PRESSURE: 120 MMHG | TEMPERATURE: 98.1 F | OXYGEN SATURATION: 93 % | HEIGHT: 60 IN | BODY MASS INDEX: 52.03 KG/M2 | HEART RATE: 68 BPM | WEIGHT: 264.99 LBS | RESPIRATION RATE: 18 BRPM

## 2022-05-23 PROBLEM — A41.9 SEPSIS (HCC): Status: RESOLVED | Noted: 2022-05-18 | Resolved: 2022-05-23

## 2022-05-23 LAB
GLUCOSE BLD STRIP.AUTO-MCNC: 133 MG/DL (ref 65–99)
GLUCOSE BLD STRIP.AUTO-MCNC: 78 MG/DL (ref 65–99)

## 2022-05-23 PROCEDURE — 82962 GLUCOSE BLOOD TEST: CPT

## 2022-05-23 PROCEDURE — 700102 HCHG RX REV CODE 250 W/ 637 OVERRIDE(OP): Performed by: INTERNAL MEDICINE

## 2022-05-23 PROCEDURE — A9270 NON-COVERED ITEM OR SERVICE: HCPCS | Performed by: INTERNAL MEDICINE

## 2022-05-23 PROCEDURE — RXMED WILLOW AMBULATORY MEDICATION CHARGE: Performed by: STUDENT IN AN ORGANIZED HEALTH CARE EDUCATION/TRAINING PROGRAM

## 2022-05-23 PROCEDURE — 99239 HOSP IP/OBS DSCHRG MGMT >30: CPT | Performed by: STUDENT IN AN ORGANIZED HEALTH CARE EDUCATION/TRAINING PROGRAM

## 2022-05-23 RX ORDER — CEPHALEXIN 500 MG/1
500 CAPSULE ORAL 4 TIMES DAILY
Qty: 12 CAPSULE | Refills: 0 | Status: SHIPPED | OUTPATIENT
Start: 2022-05-31 | End: 2022-05-23 | Stop reason: SDUPTHER

## 2022-05-23 RX ORDER — TAMSULOSIN HYDROCHLORIDE 0.4 MG/1
0.4 CAPSULE ORAL DAILY
Qty: 30 CAPSULE | Refills: 0 | Status: SHIPPED | OUTPATIENT
Start: 2022-05-23 | End: 2022-06-22

## 2022-05-23 RX ORDER — LEVOFLOXACIN 750 MG/1
750 TABLET, FILM COATED ORAL DAILY
Qty: 2 TABLET | Refills: 0 | Status: SHIPPED | OUTPATIENT
Start: 2022-05-23 | End: 2022-05-25

## 2022-05-23 RX ORDER — LEVOFLOXACIN 250 MG/1
750 TABLET, FILM COATED ORAL DAILY
Qty: 21 TABLET | Refills: 0 | Status: SHIPPED | OUTPATIENT
Start: 2022-05-23 | End: 2022-05-23 | Stop reason: SDUPTHER

## 2022-05-23 RX ORDER — CEPHALEXIN 500 MG/1
500 CAPSULE ORAL 2 TIMES DAILY
Qty: 16 CAPSULE | Refills: 0 | Status: SHIPPED | OUTPATIENT
Start: 2022-05-26 | End: 2022-06-03

## 2022-05-23 RX ADMIN — CYANOCOBALAMIN TAB 500 MCG 500 MCG: 500 TAB at 04:31

## 2022-05-23 RX ADMIN — INSULIN LISPRO 15 UNITS: 100 INJECTION, SOLUTION INTRAVENOUS; SUBCUTANEOUS at 09:00

## 2022-05-23 RX ADMIN — OMEPRAZOLE 40 MG: 20 CAPSULE, DELAYED RELEASE ORAL at 04:26

## 2022-05-23 RX ADMIN — CHOLECALCIFEROL TAB 125 MCG (5000 UNIT) 5000 UNITS: 125 TAB at 04:21

## 2022-05-23 RX ADMIN — TAMSULOSIN HYDROCHLORIDE 0.4 MG: 0.4 CAPSULE ORAL at 04:21

## 2022-05-23 RX ADMIN — SENNOSIDES AND DOCUSATE SODIUM 2 TABLET: 50; 8.6 TABLET ORAL at 04:21

## 2022-05-23 RX ADMIN — TRIAMTERENE AND HYDROCHLOROTHIAZIDE 1 CAPSULE: 37.5; 25 CAPSULE ORAL at 04:21

## 2022-05-23 RX ADMIN — CETIRIZINE HYDROCHLORIDE 10 MG: 10 TABLET, FILM COATED ORAL at 04:22

## 2022-05-23 NOTE — DISCHARGE SUMMARY
Discharge Summary    CHIEF COMPLAINT ON ADMISSION  Chief Complaint   Patient presents with   • Post-Op Complications     Pt reports having stent placed in right kidney on 5/10 with increased fever, chills, tachy, nausea, diaphoretic beginning today.         Reason for Admission  Fever; Chills      Admission Date  5/18/2022    CODE STATUS  Full Code    HPI & HOSPITAL COURSE  Ольга Morales is a 66 y.o. female who presented 5/18/2022 with Abdominal pain.  She has not been feeling well since the 10th when she was seen in for a kidney stone and had a stent placed.  Stent had been placed initially because they were unable to extract the stone. She was given medications with which it was expected that the stone would pass. She was slated to be seen by urology on June 2 for follow-up.  Since then she has been having increasing abdominal pain on and off which has slowly worsened over the last couple of days. She has felt nauseous she has felt feverish with shaking chills especially over the last 2 days prior to admission which prompted her to come to the ER for evaluation.  She is not having any flank pain any increased urinary frequency above her baseline.    She was empirically treated with IV antibiotic.  Found to have bacteremia and growing E. coli and Proteus.  Because of this infectious disease was consulted and recommended continue antibiotic while inpatient.  Her symptoms continue to improve and now she is afebrile and vital has been stable.  Urology was also consulted and recommended that the patient be treated with antibiotic and follow-up with them on June 2 for stent removal.  Per ID patient can be discharged home with levofloxacin for 1 week followed by Keflex until she gets her stent removed on June 2.  She was instructed to come back to ER if her symptoms get worse.    Patient is afebrile, comfortable and eager for discharge. She was educated regarding her new medications.       Therefore, she is discharged  in fair and stable condition to home with close outpatient follow-up.    The patient met 2-midnight criteria for an inpatient stay at the time of discharge.    Discharge Date  05/23/22      FOLLOW UP ITEMS POST DISCHARGE  Follow up with urology/stent removal   Chronic medical conditions     DISCHARGE DIAGNOSES  Principal Problem (Resolved):    Sepsis (HCC) POA: Yes  Active Problems:    Complicated UTI (urinary tract infection) POA: Unknown    DM (diabetes mellitus) (HCC) POA: Unknown    CVA (cerebral vascular accident) (HCC) POA: Unknown    HTN (hypertension) POA: Unknown    GERD (gastroesophageal reflux disease) POA: Unknown    Seasonal allergic rhinitis POA: Unknown    Bacteremia POA: Unknown      FOLLOW UP  No future appointments.  Lucina Braswell D.O.  [address]  [address]  [facility] [address] [phone]    Follow up in 1 week(s)      Cruz Carver M.D.  [address]  [facility] [address] [phone]    Follow up on 6/2/2022        MEDICATIONS ON DISCHARGE     Medication List      START taking these medications      Instructions   cephALEXin 500 MG Caps  Start taking on: May 26, 2022  Commonly known as: KEFLEX   Take 1 Capsule by mouth 2 times a day for 8 days.  Dose: 500 mg     levoFLOXacin 250 MG Tabs  Commonly known as: LEVAQUIN   Take 3 Tablets by mouth every day for 2 days.  Dose: 750 mg        CONTINUE taking these medications      Instructions   aspirin  MG Tbec  Commonly known as: ECOTRIN   Take 325 mg by mouth at bedtime.  Dose: 325 mg     atorvastatin 40 MG Tabs  Commonly known as: LIPITOR   Take 40 mg by mouth at bedtime.  Dose: 40 mg     cetirizine 10 MG Tabs  Commonly known as: ZYRTEC   Take 10 mg by mouth every day.  Dose: 10 mg     cholecalciferol 5000 UNIT Caps  Commonly known as: VITAMIN D3   Take 5,000 Units by mouth every day.  Dose: 5,000 Units     cyanocobalamin 500 MCG tablet  Commonly known as: vitamin b12   Take 500 mcg by mouth every day.  Dose: 500 mcg      docusate sodium 100 MG Caps   Take 100 mg by mouth as needed for Constipation.  Dose: 100 mg     HYDROcodone-acetaminophen 5-325 MG Tabs per tablet  Commonly known as: NORCO   Take 1 Tablet by mouth every 8 hours as needed for Moderate Pain.  Dose: 1 Tablet     insulin aspart 100 UNIT/ML injection PEN  Commonly known as: NovoLOG   Inject 40 Units under the skin 4 Times a Day,Before Meals and at Bedtime.  Dose: 40 Units     Levemir FlexTouch 100 UNIT/ML injection PEN  Generic drug: insulin detemir   Inject 105 Units under the skin every evening.  Dose: 105 Units     omeprazole 40 MG delayed-release capsule  Commonly known as: PRILOSEC   Take 40 mg by mouth every day.  Dose: 40 mg     oxybutynin 5 MG Tabs  Commonly known as: DITROPAN   Take 5 mg by mouth 3 times a day as needed.  Dose: 5 mg     potassium chloride ER 10 MEQ tablet  Commonly known as: KLOR-CON   Take 10 mEq by mouth every day.  Dose: 10 mEq     Psyllium 0.52 GM Caps   Take 1.56 g by mouth as needed for Constipation. 3 capsules  Dose: 1.56 g     tamsulosin 0.4 MG capsule  Commonly known as: FLOMAX   Take 1 Capsule by mouth every day for 30 days.  Dose: 0.4 mg     triamterene/hctz 37.5-25 MG Caps  Commonly known as: MAXZIDE-25/DYAZIDE   Take 1 Capsule by mouth every day.  Dose: 1 Capsule     Vitamin B + C Complex Tabs   Take 1 Tablet by mouth every day.  Dose: 1 Tablet            Allergies  Allergies   Allergen Reactions   • Aminoglycosides      itching   • Clindamycin      itching   • Codeine      Itching     • Demerol      n/v   • Lisinopril      cough   • Macrobid [Nitrofurantoin]      hives   • Metformin      diarrhea   • Naltrexone      Bad thoughts   • Oxycontin [Oxycodone]      hallucinations   • Penicillin G      itching   • Pioglitazone      itchy       DIET  Orders Placed This Encounter   Procedures   • Diet Order Diet: Consistent CHO (Diabetic)     Standing Status:   Standing     Number of Occurrences:   1     Order Specific Question:   Diet:      Answer:   Consistent CHO (Diabetic) [4]       ACTIVITY  As tolerated.  Weight bearing as tolerated    CONSULTATIONS  ID  urology    PROCEDURES  NA    LABORATORY  Lab Results   Component Value Date    SODIUM 140 05/20/2022    POTASSIUM 3.7 05/20/2022    CHLORIDE 105 05/20/2022    CO2 24 05/20/2022    GLUCOSE 171 (H) 05/20/2022    BUN 13 05/20/2022    CREATININE 0.98 05/20/2022        Lab Results   Component Value Date    WBC 6.0 05/22/2022    HEMOGLOBIN 10.1 (L) 05/22/2022    HEMATOCRIT 31.5 (L) 05/22/2022    PLATELETCT 186 05/22/2022        Total time of the discharge process exceeds 38 minutes.

## 2022-05-23 NOTE — DISCHARGE PLANNING
Case Management Discharge Planning    Admission Date: 5/18/2022  GMLOS: 4.7  ALOS: 5    6-Clicks ADL Score: 23  6-Clicks Mobility Score: 24      Anticipated Discharge Dispo:  Home, self care    DME Needed: No    Action(s) Taken: DC Assessment Complete (See below)    Escalations Completed: None    Medically Clear: Yes    Next Steps: Patient discussed with MD, will dc home today with oral antibiotics. Patient given cab voucher for transport.    Barriers to Discharge: None    Is the patient up for discharge tomorrow: No, DC today.

## 2022-05-23 NOTE — CARE PLAN
Problem: Knowledge Deficit - Standard  Goal: Patient and family/care givers will demonstrate understanding of plan of care, disease process/condition, diagnostic tests and medications  Outcome: Progressing     Problem: Fall Risk  Goal: Patient will remain free from falls  Outcome: Progressing     Problem: Pain - Standard  Goal: Alleviation of pain or a reduction in pain to the patient’s comfort goal  Outcome: Progressing     The patient is Stable - Low risk of patient condition declining or worsening    Shift Goals  Clinical Goals: pain management  Patient Goals: rest    Progress made toward(s) clinical / shift goals:  yes    Patient is not progressing towards the following goals:

## 2022-05-23 NOTE — DISCHARGE PLANNING
Meds-to-Beds: Discharge prescription orders listed below delivered to patient's bedside. RN Courtney notified. Patient counseled.      Current Outpatient Medications   Medication Sig Dispense Refill   • tamsulosin (FLOMAX) 0.4 MG capsule Take 1 Capsule by mouth every day for 30 days. 30 Capsule 0   • levoFLOXacin (LEVAQUIN) 750 MG tablet Take 1 Tablet by mouth every day for 2 days. 2 Tablet 0   • [START ON 5/26/2022] cephALEXin (KEFLEX) 500 MG Cap Take 1 Capsule by mouth 2 times a day for 8 days. 16 Capsule 0      Mona Garcia, PharmD

## 2022-05-23 NOTE — DISCHARGE INSTRUCTIONS
Take medications as directed  new medications include:  levofloxacin, take 3 times day until 5/25  starting 5/26 take keflex twice daily until June 3rd.   If you develop any fevers, chills, recurrent flank pain or develop diarrhea, seek medical attention   Follow up with urology as scheduled     Discharge Instructions    Discharged to home by car with relative. Discharged via wheelchair, hospital escort: Yes.  Special equipment needed: Not Applicable    Be sure to schedule a follow-up appointment with your primary care doctor or any specialists as instructed.     Discharge Plan:   Diet Plan: Discussed  Activity Level: Discussed  Confirmed Follow up Appointment: Patient to Call and Schedule Appointment  Confirmed Symptoms Management: Discussed  Medication Reconciliation Updated: Yes    I understand that a diet low in cholesterol, fat, and sodium is recommended for good health. Unless I have been given specific instructions below for another diet, I accept this instruction as my diet prescription.   Other diet: diabetic    Special Instructions: None    Is patient discharged on Warfarin / Coumadin?   No     Depression / Suicide Risk    As you are discharged from this [facility] facility, it is important to learn how to keep safe from harming yourself.    Recognize the warning signs:  Abrupt changes in personality, positive or negative- including increase in energy   Giving away possessions  Change in eating patterns- significant weight changes-  positive or negative  Change in sleeping patterns- unable to sleep or sleeping all the time   Unwillingness or inability to communicate  Depression  Unusual sadness, discouragement and loneliness  Talk of wanting to die  Neglect of personal appearance   Rebelliousness- reckless behavior  Withdrawal from people/activities they love  Confusion- inability to concentrate     If you or a loved one observes any of these behaviors or has concerns about self-harm, here's what you  can do:  Talk about it- your feelings and reasons for harming yourself  Remove any means that you might use to hurt yourself (examples: pills, rope, extension cords, firearm)  Get professional help from the community (Mental Health, Substance Abuse, psychological counseling)  Do not be alone:Call your Safe Contact- someone whom you trust who will be there for you.  Call your local CRISIS HOTLINE 681-9841 or 209-391-7712  Call your local Children's Mobile Crisis Response Team [place] (678) 306-6712 or www.[employer]  Call the toll free National Suicide Prevention Hotlines   National Suicide Prevention Lifeline 609-558-UYHX (9500)  National Hope Line Network 800-SUICIDE (926-5707)

## 2022-05-26 LAB
BACTERIA BLD CULT: NORMAL
BACTERIA BLD CULT: NORMAL
SIGNIFICANT IND 70042: NORMAL
SIGNIFICANT IND 70042: NORMAL
SITE SITE: NORMAL
SITE SITE: NORMAL
SOURCE SOURCE: NORMAL
SOURCE SOURCE: NORMAL

## 2022-06-01 ENCOUNTER — PRE-ADMISSION TESTING (OUTPATIENT)
Dept: ADMISSIONS | Facility: MEDICAL CENTER | Age: 67
End: 2022-06-01
Attending: UROLOGY
Payer: MEDICAID

## 2022-06-01 DIAGNOSIS — Z01.812 PRE-OPERATIVE LABORATORY EXAMINATION: ICD-10-CM

## 2022-06-01 LAB
INR PPP: 1.09 (ref 0.87–1.13)
PROTHROMBIN TIME: 13.8 SEC (ref 12–14.6)

## 2022-06-01 PROCEDURE — 36415 COLL VENOUS BLD VENIPUNCTURE: CPT

## 2022-06-01 PROCEDURE — 85610 PROTHROMBIN TIME: CPT

## 2022-06-01 ASSESSMENT — FIBROSIS 4 INDEX: FIB4 SCORE: 1.63

## 2022-06-01 NOTE — OR NURSING
Pt's history/allergies/meds faxed to [facility] per request from [place].

## 2022-06-02 ENCOUNTER — APPOINTMENT (OUTPATIENT)
Dept: RADIOLOGY | Facility: MEDICAL CENTER | Age: 67
End: 2022-06-02
Attending: UROLOGY
Payer: MEDICAID

## 2022-06-02 ENCOUNTER — HOSPITAL ENCOUNTER (OUTPATIENT)
Facility: MEDICAL CENTER | Age: 67
End: 2022-06-02
Attending: UROLOGY | Admitting: UROLOGY
Payer: MEDICAID

## 2022-06-02 ENCOUNTER — ANESTHESIA (OUTPATIENT)
Dept: SURGERY | Facility: MEDICAL CENTER | Age: 67
End: 2022-06-02
Payer: MEDICAID

## 2022-06-02 ENCOUNTER — ANESTHESIA EVENT (OUTPATIENT)
Dept: SURGERY | Facility: MEDICAL CENTER | Age: 67
End: 2022-06-02
Payer: MEDICAID

## 2022-06-02 VITALS
OXYGEN SATURATION: 96 % | BODY MASS INDEX: 49.47 KG/M2 | RESPIRATION RATE: 16 BRPM | SYSTOLIC BLOOD PRESSURE: 140 MMHG | TEMPERATURE: 97 F | WEIGHT: 251.99 LBS | HEART RATE: 78 BPM | DIASTOLIC BLOOD PRESSURE: 65 MMHG | HEIGHT: 60 IN

## 2022-06-02 LAB — GLUCOSE BLD STRIP.AUTO-MCNC: 106 MG/DL (ref 65–99)

## 2022-06-02 PROCEDURE — C1758 CATHETER, URETERAL: HCPCS | Performed by: UROLOGY

## 2022-06-02 PROCEDURE — 160025 RECOVERY II MINUTES (STATS): Performed by: UROLOGY

## 2022-06-02 PROCEDURE — 160046 HCHG PACU - 1ST 60 MINS PHASE II: Performed by: UROLOGY

## 2022-06-02 PROCEDURE — 700105 HCHG RX REV CODE 258: Performed by: UROLOGY

## 2022-06-02 PROCEDURE — 700111 HCHG RX REV CODE 636 W/ 250 OVERRIDE (IP): Performed by: ANESTHESIOLOGY

## 2022-06-02 PROCEDURE — 160036 HCHG PACU - EA ADDL 30 MINS PHASE I: Performed by: UROLOGY

## 2022-06-02 PROCEDURE — 700117 HCHG RX CONTRAST REV CODE 255: Performed by: UROLOGY

## 2022-06-02 PROCEDURE — C1769 GUIDE WIRE: HCPCS | Performed by: UROLOGY

## 2022-06-02 PROCEDURE — 110371 HCHG SHELL REV 272: Performed by: UROLOGY

## 2022-06-02 PROCEDURE — 160028 HCHG SURGERY MINUTES - 1ST 30 MINS LEVEL 3: Performed by: UROLOGY

## 2022-06-02 PROCEDURE — 00910 ANES TRANSURETHRAL PX NOS: CPT | Performed by: ANESTHESIOLOGY

## 2022-06-02 PROCEDURE — 160035 HCHG PACU - 1ST 60 MINS PHASE I: Performed by: UROLOGY

## 2022-06-02 PROCEDURE — 160039 HCHG SURGERY MINUTES - EA ADDL 1 MIN LEVEL 3: Performed by: UROLOGY

## 2022-06-02 PROCEDURE — C1894 INTRO/SHEATH, NON-LASER: HCPCS | Performed by: UROLOGY

## 2022-06-02 PROCEDURE — 160002 HCHG RECOVERY MINUTES (STAT): Performed by: UROLOGY

## 2022-06-02 PROCEDURE — 82962 GLUCOSE BLOOD TEST: CPT

## 2022-06-02 PROCEDURE — 160009 HCHG ANES TIME/MIN: Performed by: UROLOGY

## 2022-06-02 PROCEDURE — 160048 HCHG OR STATISTICAL LEVEL 1-5: Performed by: UROLOGY

## 2022-06-02 RX ORDER — MIDAZOLAM HYDROCHLORIDE 1 MG/ML
1 INJECTION INTRAMUSCULAR; INTRAVENOUS
Status: DISCONTINUED | OUTPATIENT
Start: 2022-06-02 | End: 2022-06-02 | Stop reason: HOSPADM

## 2022-06-02 RX ORDER — SODIUM CHLORIDE, SODIUM LACTATE, POTASSIUM CHLORIDE, CALCIUM CHLORIDE 600; 310; 30; 20 MG/100ML; MG/100ML; MG/100ML; MG/100ML
INJECTION, SOLUTION INTRAVENOUS CONTINUOUS
Status: ACTIVE | OUTPATIENT
Start: 2022-06-02 | End: 2022-06-02

## 2022-06-02 RX ORDER — HYDROMORPHONE HYDROCHLORIDE 1 MG/ML
0.2 INJECTION, SOLUTION INTRAMUSCULAR; INTRAVENOUS; SUBCUTANEOUS
Status: DISCONTINUED | OUTPATIENT
Start: 2022-06-02 | End: 2022-06-02 | Stop reason: HOSPADM

## 2022-06-02 RX ORDER — HALOPERIDOL 5 MG/ML
1 INJECTION INTRAMUSCULAR
Status: DISCONTINUED | OUTPATIENT
Start: 2022-06-02 | End: 2022-06-02 | Stop reason: HOSPADM

## 2022-06-02 RX ORDER — OXYCODONE HCL 5 MG/5 ML
5 SOLUTION, ORAL ORAL
Status: DISCONTINUED | OUTPATIENT
Start: 2022-06-02 | End: 2022-06-02 | Stop reason: HOSPADM

## 2022-06-02 RX ORDER — CIPROFLOXACIN 2 MG/ML
INJECTION, SOLUTION INTRAVENOUS PRN
Status: DISCONTINUED | OUTPATIENT
Start: 2022-06-02 | End: 2022-06-02 | Stop reason: SURG

## 2022-06-02 RX ORDER — DIPHENHYDRAMINE HYDROCHLORIDE 50 MG/ML
12.5 INJECTION INTRAMUSCULAR; INTRAVENOUS
Status: DISCONTINUED | OUTPATIENT
Start: 2022-06-02 | End: 2022-06-02 | Stop reason: HOSPADM

## 2022-06-02 RX ORDER — MEPERIDINE HYDROCHLORIDE 25 MG/ML
12.5 INJECTION INTRAMUSCULAR; INTRAVENOUS; SUBCUTANEOUS
Status: DISCONTINUED | OUTPATIENT
Start: 2022-06-02 | End: 2022-06-02 | Stop reason: HOSPADM

## 2022-06-02 RX ORDER — HYDROMORPHONE HYDROCHLORIDE 1 MG/ML
0.4 INJECTION, SOLUTION INTRAMUSCULAR; INTRAVENOUS; SUBCUTANEOUS
Status: DISCONTINUED | OUTPATIENT
Start: 2022-06-02 | End: 2022-06-02 | Stop reason: HOSPADM

## 2022-06-02 RX ORDER — IPRATROPIUM BROMIDE AND ALBUTEROL SULFATE 2.5; .5 MG/3ML; MG/3ML
3 SOLUTION RESPIRATORY (INHALATION)
Status: DISCONTINUED | OUTPATIENT
Start: 2022-06-02 | End: 2022-06-02 | Stop reason: HOSPADM

## 2022-06-02 RX ORDER — HYDROMORPHONE HYDROCHLORIDE 1 MG/ML
1 INJECTION, SOLUTION INTRAMUSCULAR; INTRAVENOUS; SUBCUTANEOUS
Status: DISCONTINUED | OUTPATIENT
Start: 2022-06-02 | End: 2022-06-02 | Stop reason: HOSPADM

## 2022-06-02 RX ORDER — OXYCODONE HCL 5 MG/5 ML
10 SOLUTION, ORAL ORAL
Status: DISCONTINUED | OUTPATIENT
Start: 2022-06-02 | End: 2022-06-02 | Stop reason: HOSPADM

## 2022-06-02 RX ORDER — ONDANSETRON 2 MG/ML
4 INJECTION INTRAMUSCULAR; INTRAVENOUS
Status: DISCONTINUED | OUTPATIENT
Start: 2022-06-02 | End: 2022-06-02 | Stop reason: HOSPADM

## 2022-06-02 RX ORDER — SODIUM CHLORIDE, SODIUM GLUCONATE, SODIUM ACETATE, POTASSIUM CHLORIDE AND MAGNESIUM CHLORIDE 526; 502; 368; 37; 30 MG/100ML; MG/100ML; MG/100ML; MG/100ML; MG/100ML
500 INJECTION, SOLUTION INTRAVENOUS CONTINUOUS
Status: DISCONTINUED | OUTPATIENT
Start: 2022-06-02 | End: 2022-06-02 | Stop reason: HOSPADM

## 2022-06-02 RX ADMIN — SODIUM CHLORIDE, POTASSIUM CHLORIDE, SODIUM LACTATE AND CALCIUM CHLORIDE: 600; 310; 30; 20 INJECTION, SOLUTION INTRAVENOUS at 13:44

## 2022-06-02 RX ADMIN — FENTANYL CITRATE 50 MCG: 50 INJECTION, SOLUTION INTRAMUSCULAR; INTRAVENOUS at 18:09

## 2022-06-02 RX ADMIN — SODIUM CHLORIDE, POTASSIUM CHLORIDE, SODIUM LACTATE AND CALCIUM CHLORIDE: 600; 310; 30; 20 INJECTION, SOLUTION INTRAVENOUS at 16:50

## 2022-06-02 RX ADMIN — PROPOFOL 200 MG: 10 INJECTION, EMULSION INTRAVENOUS at 16:56

## 2022-06-02 RX ADMIN — CIPROFLOXACIN 400 MG: 2 INJECTION, SOLUTION INTRAVENOUS at 16:55

## 2022-06-02 ASSESSMENT — FIBROSIS 4 INDEX: FIB4 SCORE: 1.63

## 2022-06-02 ASSESSMENT — PAIN DESCRIPTION - PAIN TYPE: TYPE: SURGICAL PAIN

## 2022-06-02 NOTE — OR NURSING
Assumed care of patient in pre-op.   Consents for surgery and anesthesia signed.   Pre-op checklist complete.   PIV started.   FSBG= 106.  Belongings locked up.   Call light in reach. Safety precautions in place. POC reviewed with patient.

## 2022-06-02 NOTE — ANESTHESIA PREPROCEDURE EVALUATION
Case: [id number] Date/Time: 06/02/22 1445    Procedures:       CYSTOSCOPY, WITH URETERAL STENT INSERTION (Right )      URETEROSCOPY      LITHOTRIPSY, USING LASER    Pre-op diagnosis: KIDNEY STONE    Location: [facility] OR 18 / SURGERY [facility]    Surgeons: Benjie Fields M.D.          Relevant Problems   NEURO   (positive) CVA (cerebral vascular accident) (HCC)      CARDIAC   (positive) HTN (hypertension)      GI   (positive) GERD (gastroesophageal reflux disease)       Physical Exam    Airway   Mallampati: III  TM distance: >3 FB  Neck ROM: limited       Cardiovascular - normal exam  Rhythm: regular  Rate: normal  (-) murmur     Dental - normal exam           Pulmonary - normal exam  Breath sounds clear to auscultation     Abdominal   (+) obese     Neurological - normal exam         Other findings: Severe and morbid obesity, multiple allergies            Anesthesia Plan    ASA 3       Plan - general       Airway plan will be ETT          Induction: intravenous    Postoperative Plan: Postoperative administration of opioids is intended.    Pertinent diagnostic labs and testing reviewed    Informed Consent:    Anesthetic plan and risks discussed with patient.    Use of blood products discussed with: patient whom consented to blood products.

## 2022-06-03 NOTE — OP REPORT
DATE OF SERVICE:  06/02/2022     PREOPERATIVE DIAGNOSIS:  Right renal calculus.     POSTOPERATIVE DIAGNOSIS:  No remaining stone.     PROCEDURE:  Right ureteroscopy.     INDICATIONS FOR PROCEDURE:  The patient had previously been hospitalized for   urosepsis, was found to have a 3 mm proximal ureteral calculus.  She was   managed with an indwelling stent placement.  She returns at this time for   ureteroscopy and laser lithotripsy.     OPERATIVE REPORT:  The patient was brought to the operating room, was given a   general anesthetic, placed in lithotomy position, prepped and draped in   sterile fashion.  Fluoroscopic images showed the stent in position overlying   the right renal shadow and ureter.  Cystoscopy was carried out.  Stent was   identified, grasped, pulled out to the meatus.  Wire was then passed through   the stent up to the kidney.  Over this, the inner trocar of the ureteral   access sheath was placed up to below the ureteropelvic junction without   resistance.  The entire sheath was then placed.  I then passed the disposable   flexible scope into the kidney.  Opacification of the collecting system shows   3 infundibula with an upper, mid and lower caliceal complexes.  The upper   calyceal complex was scrutinized, no stones identified.  She had 2 calices in   the mid portion that run separate small infundibula.  These were also found to   be free of stones and then lower pole caliceal group and found to be free of   stones.  I examined the renal pelvis, no stones.  I pulled the scope back to   the proximal ureter, no stone identified.  I then did 3 more circuits of the   kidney to be absolutely certain there are no remaining stones in the kidney.    Ultimately satisfied there was no stone remaining and the stone we were   looking for was only reported to be 3 mm in size, so it was certainly   something that we may have pulled out when we removed the stent or alongside   the stent.  I then observed the  kidney while retracting the scope from the   ureteropelvic junction to the bladder with no stone identified.  I then   repassed the scope into the bladder and reviewed the bladder again to see if   there was any fragments in the bladder, which were not.  At this point, I felt   with confidence that she has no remaining stone in the right collecting   system or bladder.  No stent was replaced.  She is sent to recovery in stable   condition.     FINAL DIAGNOSIS:  Status post right ureteroscopy for evaluation of residual   calculi.        ______________________________  MD JOSELIN Weber/MICKEY    DD:  06/02/2022 17:32  DT:  06/02/2022 18:57    Job#:  [id number]

## 2022-06-03 NOTE — DISCHARGE INSTRUCTIONS
ACTIVITY: Rest and take it easy for the first 24 hours.  A responsible adult is recommended to remain with you during that time.  It is normal to feel sleepy.  We encourage you to not do anything that requires balance, judgment or coordination.    MILD FLU-LIKE SYMPTOMS ARE NORMAL. YOU MAY EXPERIENCE GENERALIZED MUSCLE ACHES, THROAT IRRITATION, HEADACHE AND/OR SOME NAUSEA.    FOR 24 HOURS DO NOT:  Drive, operate machinery or run household appliances.  Drink beer or alcoholic beverages.   Make important decisions or sign legal documents.    SPECIAL INSTRUCTIONS:     Cystoscopy  Cystoscopy is a procedure that is used to help diagnose and sometimes treat conditions that affect the lower urinary tract. The lower urinary tract includes the bladder and the urethra. The urethra is the tube that drains urine from the bladder. Cystoscopy is done using a thin, tube-shaped instrument with a light and camera at the end (cystoscope). The cystoscope may be hard or flexible, depending on the goal of the procedure. The cystoscope is inserted through the urethra, into the bladder.  What can I expect after the procedure?  After the procedure, it is common to have:  Some soreness or pain in your abdomen and urethra.  Urinary symptoms. These include:  Mild pain or burning when you urinate. Pain should stop within a few minutes after you urinate. This may last for up to 1 week.  A small amount of blood in your urine for several days.  Feeling like you need to urinate but producing only a small amount of urine.  Follow these instructions at home:  Medicines  Take over-the-counter and prescription medicines only as told by your health care provider.  If you were prescribed an antibiotic medicine, take it as told by your health care provider. Do not stop taking the antibiotic even if you start to feel better.  General instructions  Return to your normal activities as told by your health care provider. Ask your health care provider what  activities are safe for you.  Do not drive for 24 hours if you were given a sedative during your procedure.  Watch for any blood in your urine. If the amount of blood in your urine increases, call your health care provider.  Follow instructions from your health care provider about eating or drinking restrictions.  If a tissue sample was removed for testing (biopsy) during your procedure, it is up to you to get your test results. Ask your health care provider, or the department that is doing the test, when your results will be ready.  Drink enough fluid to keep your urine pale yellow.  Keep all follow-up visits as told by your health care provider. This is important.  Contact a health care provider if you:  Have pain that gets worse or does not get better with medicine, especially pain when you urinate.  Have trouble urinating.  Have more blood in your urine.  Get help right away if you:  Have blood clots in your urine.  Have abdominal pain.  Have a fever or chills.  Are unable to urinate.  Summary  Cystoscopy is a procedure that is used to help diagnose and sometimes treat conditions that affect the lower urinary tract.  Cystoscopy is done using a thin, tube-shaped instrument with a light and camera at the end.  After the procedure, it is common to have some soreness or pain in your abdomen and urethra.  Watch for any blood in your urine. If the amount of blood in your urine increases, call your health care provider.  If you were prescribed an antibiotic medicine, take it as told by your health care provider. Do not stop taking the antibiotic even if you start to feel better.  This information is not intended to replace advice given to you by your health care provider. Make sure you discuss any questions you have with your health care provider.    DIET: To avoid nausea, slowly advance diet as tolerated, avoiding spicy or greasy foods for the first day.  Add more substantial food to your diet according to your  physician's instructions.  Babies can be fed formula or breast milk as soon as they are hungry.  INCREASE FLUIDS AND FIBER TO AVOID CONSTIPATION.    SURGICAL DRESSING/BATHING: May shower in 24 hours.     FOLLOW-UP APPOINTMENT:  A follow-up appointment should be arranged with your doctor; call to schedule.    You should CALL YOUR PHYSICIAN if you develop:  Fever greater than 101 degrees F.  Pain not relieved by medication, or persistent nausea or vomiting.  Excessive bleeding (blood soaking through dressing) or unexpected drainage from the wound.  Extreme redness or swelling around the incision site, drainage of pus or foul smelling drainage.  Inability to urinate or empty your bladder within 8 hours.  Problems with breathing or chest pain.    You should call 911 if you develop problems with breathing or chest pain.  If you are unable to contact your doctor or surgical center, you should go to the nearest emergency room or urgent care center.  .  Physician's telephone #: [phone] Dr. Fields    If any questions arise, call your doctor.  If your doctor is not available, please feel free to call the Surgical Center at [phone].     A registered nurse may call you a few days after your surgery to see how you are doing after your procedure.    MEDICATIONS: Resume taking daily medication.  Take prescribed pain medication with food.  If no medication is prescribed, you may take non-aspirin pain medication if needed.  PAIN MEDICATION CAN BE VERY CONSTIPATING.  Take a stool softener or laxative such as senokot, pericolace, or milk of magnesia if needed.        If your physician has prescribed pain medication that includes Acetaminophen (Tylenol), do not take additional Acetaminophen (Tylenol) while taking the prescribed medication.    Depression / Suicide Risk    As you are discharged from this [facility] facility, it is important to learn how to keep safe from harming yourself.    Recognize the warning  signs:  Abrupt changes in personality, positive or negative- including increase in energy   Giving away possessions  Change in eating patterns- significant weight changes-  positive or negative  Change in sleeping patterns- unable to sleep or sleeping all the time   Unwillingness or inability to communicate  Depression  Unusual sadness, discouragement and loneliness  Talk of wanting to die  Neglect of personal appearance   Rebelliousness- reckless behavior  Withdrawal from people/activities they love  Confusion- inability to concentrate     If you or a loved one observes any of these behaviors or has concerns about self-harm, here's what you can do:  Talk about it- your feelings and reasons for harming yourself  Remove any means that you might use to hurt yourself (examples: pills, rope, extension cords, firearm)3  Get professional help from the community (Mental Health, Substance Abuse, psychological counseling)  Do not be alone:Call your Safe Contact- someone whom you trust who will be there for you.  Call your local CRISIS HOTLINE 549-2047 or 379-282-1637  Call your local Children's Mobile Crisis Response Team [place] (398) 651-0416 or www.[employer]  Call the toll free National Suicide Prevention Hotlines   National Suicide Prevention Lifeline 292-681-SCAL (5965)  National Hope Line Network 800-SUICIDE (949-7677)

## 2022-06-03 NOTE — OR NURSING
Pt's VSS; denies N/V; states pain is at tolerable level.D/c orders received.    1925  IV dc'd. Pt changed into clothing with assistance. Pt up and ambulated to BR, steady gait, voided adequately. Discharge instructions given ; pt and family verbalized understanding and questions answered. Patient states ready to d/c home. No prescriptions given. Pt dc'd in w/c with  RN.

## 2022-06-03 NOTE — ANESTHESIA PROCEDURE NOTES
Airway  Performed by: Lito Car M.D.  Authorized by: Lito Car M.D.     Location:  OR  Urgency:  Elective  Indications for Airway Management:  Anesthesia      Spontaneous Ventilation: absent    Sedation Level:  Deep  Preoxygenated: Yes    Final Airway Type:  Supraglottic airway  Final Supraglottic Airway:  Standard LMA    SGA Size:  4  Number of Attempts at Approach:  1

## 2022-06-03 NOTE — OR NURSING
Pt arrived from OR to PACU at 1737, pt identification verified by team, pt placed on all monitors with alarms audible, report and care of pt received from Anesthesiologist and RN.     1750- Pt son called unit for update on mother, updated that she just arrived to PACU area and is still sleeping d/t anesthesia. Aware that he may come back once she is in the phase II area.     1900- Report called to MASSIEL Camp in phase II. [place] to phase [place].

## 2022-06-03 NOTE — ANESTHESIA TIME REPORT
Anesthesia Start and Stop Event Times     Date Time Event    6/2/2022 1629 Ready for Procedure     1650 Anesthesia Start     1743 Anesthesia Stop        Responsible Staff  06/02/22    Name Role Begin End    Lito Car M.D. Anesth 1650 1746        Overtime Reason:  no overtime (within assigned shift)    Comments:

## 2022-06-03 NOTE — ANESTHESIA POSTPROCEDURE EVALUATION
Patient: Ольга Morales    Procedure Summary     Date: 06/02/22 Room / Location: [place] [address] / SURGERY [facility]    Anesthesia Start: 1650 Anesthesia Stop: 1743    Procedures:       CYSTOSCOPY, WITH URETERAL STENT REMOVAL (Right Ureter)      URETEROSCOPY (Right Ureter) Diagnosis: (NO STONES)    Surgeons: Benjie Fields M.D. Responsible Provider: Lito Car M.D.    Anesthesia Type: general ASA Status: 3          Final Anesthesia Type: general  Last vitals  BP   Blood Pressure: (!) 155/70    Temp   36 °C (96.8 °F)    Pulse   80   Resp   16    SpO2   99 %      Anesthesia Post Evaluation    Patient location during evaluation: PACU  Patient participation: complete - patient participated  Level of consciousness: awake and alert    Airway patency: patent  Anesthetic complications: no  Cardiovascular status: hemodynamically stable  Respiratory status: acceptable  Hydration status: euvolemic    PONV: none          There were no known complications for this encounter.     Nurse Pain Score: 0 (NPRS)

## 2022-08-18 ENCOUNTER — HOSPITAL ENCOUNTER (EMERGENCY)
Facility: MEDICAL CENTER | Age: 67
End: 2022-08-19
Attending: STUDENT IN AN ORGANIZED HEALTH CARE EDUCATION/TRAINING PROGRAM
Payer: MEDICAID

## 2022-08-18 DIAGNOSIS — T38.3X1A INSULIN OVERDOSE, ACCIDENTAL OR UNINTENTIONAL, INITIAL ENCOUNTER: ICD-10-CM

## 2022-08-18 LAB
ALBUMIN SERPL BCP-MCNC: 4.6 G/DL (ref 3.2–4.9)
ALBUMIN/GLOB SERPL: 1.4 G/DL
ALP SERPL-CCNC: 113 U/L (ref 30–99)
ALT SERPL-CCNC: 17 U/L (ref 2–50)
ANION GAP SERPL CALC-SCNC: 14 MMOL/L (ref 7–16)
AST SERPL-CCNC: 19 U/L (ref 12–45)
BASOPHILS # BLD AUTO: 0.5 % (ref 0–1.8)
BASOPHILS # BLD: 0.04 K/UL (ref 0–0.12)
BILIRUB SERPL-MCNC: 0.6 MG/DL (ref 0.1–1.5)
BUN SERPL-MCNC: 19 MG/DL (ref 8–22)
CALCIUM SERPL-MCNC: 9.5 MG/DL (ref 8.5–10.5)
CHLORIDE SERPL-SCNC: 99 MMOL/L (ref 96–112)
CO2 SERPL-SCNC: 23 MMOL/L (ref 20–33)
CREAT SERPL-MCNC: 1.03 MG/DL (ref 0.5–1.4)
EOSINOPHIL # BLD AUTO: 0 K/UL (ref 0–0.51)
EOSINOPHIL NFR BLD: 0 % (ref 0–6.9)
ERYTHROCYTE [DISTWIDTH] IN BLOOD BY AUTOMATED COUNT: 42.2 FL (ref 35.9–50)
GFR SERPLBLD CREATININE-BSD FMLA CKD-EPI: 60 ML/MIN/1.73 M 2
GLOBULIN SER CALC-MCNC: 3.2 G/DL (ref 1.9–3.5)
GLUCOSE BLD STRIP.AUTO-MCNC: 115 MG/DL (ref 65–99)
GLUCOSE BLD STRIP.AUTO-MCNC: 115 MG/DL (ref 65–99)
GLUCOSE SERPL-MCNC: 129 MG/DL (ref 65–99)
HCT VFR BLD AUTO: 41.6 % (ref 37–47)
HGB BLD-MCNC: 13.5 G/DL (ref 12–16)
IMM GRANULOCYTES # BLD AUTO: 0.03 K/UL (ref 0–0.11)
IMM GRANULOCYTES NFR BLD AUTO: 0.3 % (ref 0–0.9)
LYMPHOCYTES # BLD AUTO: 0.87 K/UL (ref 1–4.8)
LYMPHOCYTES NFR BLD: 10.1 % (ref 22–41)
MCH RBC QN AUTO: 26.9 PG (ref 27–33)
MCHC RBC AUTO-ENTMCNC: 32.5 G/DL (ref 33.6–35)
MCV RBC AUTO: 83 FL (ref 81.4–97.8)
MONOCYTES # BLD AUTO: 0.2 K/UL (ref 0–0.85)
MONOCYTES NFR BLD AUTO: 2.3 % (ref 0–13.4)
NEUTROPHILS # BLD AUTO: 7.48 K/UL (ref 2–7.15)
NEUTROPHILS NFR BLD: 86.8 % (ref 44–72)
NRBC # BLD AUTO: 0 K/UL
NRBC BLD-RTO: 0 /100 WBC
PLATELET # BLD AUTO: 274 K/UL (ref 164–446)
PMV BLD AUTO: 9.5 FL (ref 9–12.9)
POTASSIUM SERPL-SCNC: 3.7 MMOL/L (ref 3.6–5.5)
PROT SERPL-MCNC: 7.8 G/DL (ref 6–8.2)
RBC # BLD AUTO: 5.01 M/UL (ref 4.2–5.4)
SODIUM SERPL-SCNC: 136 MMOL/L (ref 135–145)
WBC # BLD AUTO: 8.6 K/UL (ref 4.8–10.8)

## 2022-08-18 PROCEDURE — 85025 COMPLETE CBC W/AUTO DIFF WBC: CPT

## 2022-08-18 PROCEDURE — 80053 COMPREHEN METABOLIC PANEL: CPT

## 2022-08-18 PROCEDURE — 36415 COLL VENOUS BLD VENIPUNCTURE: CPT

## 2022-08-18 PROCEDURE — 99284 EMERGENCY DEPT VISIT MOD MDM: CPT

## 2022-08-18 PROCEDURE — 700102 HCHG RX REV CODE 250 W/ 637 OVERRIDE(OP): Performed by: STUDENT IN AN ORGANIZED HEALTH CARE EDUCATION/TRAINING PROGRAM

## 2022-08-18 PROCEDURE — A9270 NON-COVERED ITEM OR SERVICE: HCPCS | Performed by: STUDENT IN AN ORGANIZED HEALTH CARE EDUCATION/TRAINING PROGRAM

## 2022-08-18 PROCEDURE — 82962 GLUCOSE BLOOD TEST: CPT

## 2022-08-18 RX ORDER — ACETAMINOPHEN 325 MG/1
650 TABLET ORAL ONCE
Status: COMPLETED | OUTPATIENT
Start: 2022-08-18 | End: 2022-08-18

## 2022-08-18 RX ADMIN — ACETAMINOPHEN 650 MG: 325 TABLET, FILM COATED ORAL at 23:08

## 2022-08-18 ASSESSMENT — FIBROSIS 4 INDEX
FIB4 SCORE: 1.63
FIB4 SCORE: 1.63

## 2022-08-19 VITALS
SYSTOLIC BLOOD PRESSURE: 122 MMHG | BODY MASS INDEX: 52.42 KG/M2 | TEMPERATURE: 98.6 F | WEIGHT: 267 LBS | HEIGHT: 60 IN | DIASTOLIC BLOOD PRESSURE: 60 MMHG | RESPIRATION RATE: 18 BRPM | HEART RATE: 75 BPM | OXYGEN SATURATION: 96 %

## 2022-08-19 LAB
GLUCOSE BLD STRIP.AUTO-MCNC: 101 MG/DL (ref 65–99)
GLUCOSE BLD STRIP.AUTO-MCNC: 115 MG/DL (ref 65–99)
GLUCOSE BLD STRIP.AUTO-MCNC: 121 MG/DL (ref 65–99)
GLUCOSE BLD STRIP.AUTO-MCNC: 95 MG/DL (ref 65–99)

## 2022-08-19 PROCEDURE — 82962 GLUCOSE BLOOD TEST: CPT | Mod: 91

## 2022-08-19 NOTE — ED TRIAGE NOTES
Chief Complaint   Patient presents with    Low Blood Sugar     Pt accidentally took 105 units fast acting insulin rather than her long acting insulin at 8 pm today. FSBG for . Pt states she feels her sugar is dropping, rechecked in triage and FSBG 121. Pt nauseous, diaphoretic, lightheaded, and confused       65 yo F to triage by EMS for above complaint. Pt states she ate a can of oranges when she realized her mistake. Protocol ordered.      Pt placed in lobby. Pt educated on triage process. Pt encouraged to alert staff for any changes.     Patient and staff wearing appropriate PPE    BP (!) 182/95 Comment: patient states she couldn't take her hypertension medication due to surgery today  Pulse 98   Temp 37.2 °C (98.9 °F) (Oral)   Resp 16   Ht 1.524 m (5')   Wt 121 kg (267 lb)   SpO2 92% Comment: TIANNA  BMI 52.14 kg/m²

## 2022-08-19 NOTE — ED NOTES
Pt discharge information provided. Pt verbalized understanding. Pt walked out to triage with MTM offered for the PT.

## 2022-08-19 NOTE — ED NOTES
Charge RN notified of pt. Pt provided orange juice and crackers. Educated to inform staff of any changes.

## 2022-08-19 NOTE — ED PROVIDER NOTES
CHIEF COMPLAINT  Chief Complaint   Patient presents with    Low Blood Sugar     Pt accidentally took 105 units fast acting insulin rather than her long acting insulin at 8 pm today. FSBG for . Pt states she feels her sugar is dropping, rechecked in triage and FSBG 121. Pt nauseous, diaphoretic, lightheaded, and confused       [facility]  Ольга Morales is a 66 y.o. female who has a history of diabetes and hypertension presents for evaluation of low blood sugar onset tonight. Tonight around 8 PM, Ольга accidentally took 105 units of her fast acting insulin. Ольга states that she wanted to eat an late dinner and meant to take a small dose of her fast acting insulin. She states she must of been tired because she took her night time dose of her long acting insulin. She instantly called EMS and drank orange juice. She denies any loss of consciousness but states she had trouble finding her words. While in the ambulance she was nauseous, diaphoretic, and lightheaded. En route her blood sugar was 166 and upon her arrival to the ED it was 121. She has drank at total of 6 orange juices. She denies any fever, chills, vomiting, chest pain or shortness of breath. No alleviating or exacerbating factors were reported. Today Ольга had surgery on her left hand when she got home she took a narcotic pain pill. She had a stroke in November of 2021. She denies smoking or drinking.      Patient reports family history of stroke and heart disease    REVIEW OF SYSTEMS  As per HPI, otherwise a 10 point review of systems is negative    PAST MEDICAL HISTORY  Past Medical History:   Diagnosis Date    Anesthesia 06/01/2022    questionable difficult intubation, sore throat in PACU    Anesthesia 06/01/2022    pt states significant motion sickness issues    Arrhythmia 06/01/2022    svt    Arthritis 06/01/2022    hands    Bowel habit changes 06/01/2022    constipation and diarrhea intermittent, frequently    Breath shortness 06/01/2022    with exertion     Dental disorder 06/01/2022    upper and lower full plates    Diabetes (HCC) 06/01/2022    insulin    Heart burn 06/01/2022    medicated    Hypertension 06/01/2022    medicated    Indigestion 06/01/2022    medicated    PONV (postoperative nausea and vomiting) 06/01/2022    pt states significant issues with motion sickness    Psychiatric problem 06/01/2022    ptsd    Renal disorder 06/01/2022    history of kidney stones    Sleep apnea 06/01/2022    positive sleep study, waiting on CPAP    Snoring 06/01/2022    Stroke (HCC) 06/01/2022 11/28/22 stroke with left leg weakness, takes atorvastatin for prevention    Urinary incontinence 06/01/2022    at times       FAMILY HISTORY   Family history of stroke and heart diease    SOCIAL HISTORY  Social History     Tobacco Use    Smoking status: Never    Smokeless tobacco: Never   Vaping Use    Vaping Use: Never used   Substance Use Topics    Alcohol use: Not Currently    Drug use: Not Currently       SURGICAL HISTORY  Past Surgical History:   Procedure Laterality Date    AL CYSTOSCOPY,INSERT URETERAL STENT Right 6/2/2022    Procedure: CYSTOSCOPY, WITH URETERAL STENT REMOVAL;  Surgeon: Benjie Fields M.D.;  Location: SURGERY [facility];  Service: Urology    AL CYSTO/URETERO/PYELOSCOPY, DX Right 6/2/2022    Procedure: URETEROSCOPY;  Surgeon: Benjie Fields M.D.;  Location: SURGERY [facility];  Service: Urology    OTHER ABDOMINAL SURGERY  10/22/2021    appendectomy and exploratory surgery    OTHER ORTHOPEDIC SURGERY  12/22/2020    trigger finger release long and ring finger    OTHER ORTHOPEDIC SURGERY  11/20/2019    trigger finger release    GYN SURGERY  08/21/2018    hysterectomy    OTHER ORTHOPEDIC SURGERY Left 07/27/2018    Rotator cuff and bicep tendon repair    OTHER ORTHOPEDIC SURGERY Right 12/17/2012    total knee replacement    OTHER ORTHOPEDIC SURGERY Left 08/30/2010    total knee replacement    OTHER  2004    kidney stone removed    OTHER ABDOMINAL SURGERY   2002    cholecystectomy    OTHER Right 1994    breast biopsy    GYN SURGERY  1989    repeat tubal ligation    GYN SURGERY  1987    repeat rectovaginal fistula repair    GYN SURGERY  1986    tubal ligation/rectovaginal fistula repair    OTHER  1966    Tonsillectomy/adenoidectomy    OTHER ABDOMINAL SURGERY      abdominoplasty sometime in the 1990's    OTHER CARDIAC SURGERY      heart catheterization-2010's ?       CURRENT MEDICATIONS  Home Medications    **Home medications have not yet been reviewed for this encounter**         ALLERGIES  Allergies   Allergen Reactions    Demerol Itching, Vomiting and Nausea     Pt states could not stop vomiting    Metformin Diarrhea     Pt states profuse diarrhea    Penicillin G Anaphylaxis    Aminoglycosides Hives, Rash and Itching     Rash/itching    Clindamycin Hives     itching    Codeine Hives     Itching      Lisinopril      cough    Macrobid [Nitrofurantoin]      hives    Naltrexone      Bad thoughts    Oxycontin [Oxycodone]      hallucinations    Pioglitazone Hives     itchy       PHYSICAL EXAM  VITAL SIGNS: BP (!) 162/69   Pulse 88   Temp 37.2 °C (98.9 °F) (Oral)   Resp 18   Ht 1.524 m (5')   Wt 121 kg (267 lb)   SpO2 96%   BMI 52.14 kg/m²    Constitutional: Awake and alert   HENT: Normal inspection  Eyes: Normal inspection  Neck: Grossly normal range of motion.  Cardiovascular: Normal heart rate, Normal rhythm.  Symmetric peripheral pulses.   Thorax & Lungs: No respiratory distress, No wheezing, No rales, No rhonchi, No chest tenderness.   Abdomen: Soft, non-distended, nontender, no mass  Skin: No obvious rash.  Back: No tenderness, No CVA tenderness.   Extremities: Warm, well perfused. No clubbing, cyanosis, edema, Left wrist in splint   Neurologic: Alert and oriented x4, Grossly normal   Psychiatric: Normal for situation    Labs:  Results for orders placed or performed during the hospital encounter of 08/18/22   CBC WITH DIFFERENTIAL   Result Value Ref Range     WBC 8.6 4.8 - 10.8 K/uL    RBC 5.01 4.20 - 5.40 M/uL    Hemoglobin 13.5 12.0 - 16.0 g/dL    Hematocrit 41.6 37.0 - 47.0 %    MCV 83.0 81.4 - 97.8 fL    MCH 26.9 (L) 27.0 - 33.0 pg    MCHC 32.5 (L) 33.6 - 35.0 g/dL    RDW 42.2 35.9 - 50.0 fL    Platelet Count 274 164 - 446 K/uL    MPV 9.5 9.0 - 12.9 fL    Neutrophils-Polys 86.80 (H) 44.00 - 72.00 %    Lymphocytes 10.10 (L) 22.00 - 41.00 %    Monocytes 2.30 0.00 - 13.40 %    Eosinophils 0.00 0.00 - 6.90 %    Basophils 0.50 0.00 - 1.80 %    Immature Granulocytes 0.30 0.00 - 0.90 %    Nucleated RBC 0.00 /100 WBC    Neutrophils (Absolute) 7.48 (H) 2.00 - 7.15 K/uL    Lymphs (Absolute) 0.87 (L) 1.00 - 4.80 K/uL    Monos (Absolute) 0.20 0.00 - 0.85 K/uL    Eos (Absolute) 0.00 0.00 - 0.51 K/uL    Baso (Absolute) 0.04 0.00 - 0.12 K/uL    Immature Granulocytes (abs) 0.03 0.00 - 0.11 K/uL    NRBC (Absolute) 0.00 K/uL   COMP METABOLIC PANEL   Result Value Ref Range    Sodium 136 135 - 145 mmol/L    Potassium 3.7 3.6 - 5.5 mmol/L    Chloride 99 96 - 112 mmol/L    Co2 23 20 - 33 mmol/L    Anion Gap 14.0 7.0 - 16.0    Glucose 129 (H) 65 - 99 mg/dL    Bun 19 8 - 22 mg/dL    Creatinine 1.03 0.50 - 1.40 mg/dL    Calcium 9.5 8.5 - 10.5 mg/dL    AST(SGOT) 19 12 - 45 U/L    ALT(SGPT) 17 2 - 50 U/L    Alkaline Phosphatase 113 (H) 30 - 99 U/L    Total Bilirubin 0.6 0.1 - 1.5 mg/dL    Albumin 4.6 3.2 - 4.9 g/dL    Total Protein 7.8 6.0 - 8.2 g/dL    Globulin 3.2 1.9 - 3.5 g/dL    A-G Ratio 1.4 g/dL   ESTIMATED GFR   Result Value Ref Range    GFR (CKD-EPI) 60 >60 mL/min/1.73 m 2   POCT glucose device results   Result Value Ref Range    POC Glucose, Blood 115 (H) 65 - 99 mg/dL   POCT glucose device results   Result Value Ref Range    POC Glucose, Blood 115 (H) 65 - 99 mg/dL   POCT glucose device results   Result Value Ref Range    POC Glucose, Blood 115 (H) 65 - 99 mg/dL   POCT glucose device results   Result Value Ref Range    POC Glucose, Blood 101 (H) 65 - 99 mg/dL   POCT glucose device  results   Result Value Ref Range    POC Glucose, Blood 95 65 - 99 mg/dL       Medications   dextrose 10 % BOLUS 25 g (has no administration in time range)   acetaminophen (Tylenol) tablet 650 mg (650 mg Oral Given 8/18/22 2308)         COURSE & MEDICAL DECISION MAKING  10:30 PM Patient was evaluated at bedside. Patient will be treated with dextrose 10%.     10:41 PM I spoke on the phone with poison control who recommended every hour glucose check. She will be monitored for 6 hours from ingestion.     10:45 PM Patient admitted to ED Observation at this time on 08/18/22 for frequent glucose checks    11:05 PM Patient will be treated with tylenol 1,000 mg tablet for her hand pain.     2:02 AM Patient was discharged from ED Observation on (8/18/2022) at this time.      This is a 66-year-old who presents after accidentally injecting 105 units of fast acting insulin this evening.  She arrives mentating normally with a normal exam and normal vital signs.  Her initial blood sugar is appropriate.  Her labs are otherwise not concerning.  Poison center was consulted and recommended frequent glucose checks and observation for 6 hours.  She did not have any episodes of hypoglycemia during her time in the ER.  She was able to tolerate p.o. and feels comfortable discharging home.     The patient will return for new or worsening symptoms and is stable at the time of discharge. The patient is referred to a primary physician for blood pressure management, diabetic screening, and for all other preventative health concerns.      DISPOSITION:  Patient will be discharged home in stable condition.    FOLLOW UP:  Lucina Braswell D.O.  [address]  [address]  [facility] [id number] [phone]    Schedule an appointment as soon as possible for a visit in 1 day      [facility], Emergency Dept  [address] [facility] [id number] [phone]    If symptoms worsen      OUTPATIENT MEDICATIONS:  Discharge Medication  List as of 8/19/2022  2:31 AM            FINAL IMPRESSION  1. Insulin overdose, accidental or unintentional, initial encounter Acute               This dictation was created using voice recognition software. The accuracy of the dictation is limited to the abilities of the software.  The nursing notes were reviewed and certain aspects of this information were incorporated into this note.      Electronically signed by: Marlen More, 8/18/2022 10:25 PM

## 2022-08-19 NOTE — DISCHARGE INSTRUCTIONS
Please return if you feel light headed or dizzy or you have any problems eating or drinking.  You should resume your normal insulin schedule tomorrow.

## 2023-01-28 ENCOUNTER — APPOINTMENT (OUTPATIENT)
Dept: URGENT CARE | Facility: PHYSICIAN GROUP | Age: 68
End: 2023-01-28
Payer: MEDICAID

## (undated) DEVICE — GLOVE BIOGEL PI INDICATOR SZ 7.5 SURGICAL PF LF -(50/BX 4BX/CA)

## (undated) DEVICE — MASK, LARYNGEAL AIRWAY #4

## (undated) DEVICE — CONNECTOR HOSE NEPTUNE FOR CYSTO ROOM

## (undated) DEVICE — TOWEL STOP TIMEOUT SAFETY FLAG (40EA/CA)

## (undated) DEVICE — GLOVE BIOGEL SZ 7.5 SURGICAL PF LTX - (50PR/BX 4BX/CA)

## (undated) DEVICE — LACTATED RINGERS INJ 1000 ML - (14EA/CA 60CA/PF)

## (undated) DEVICE — SCOPE DIGITAL URETEROSCOPE DISPOSABLE

## (undated) DEVICE — HEAD HOLDER JUNIOR/ADULT

## (undated) DEVICE — WIRE GUIDE SENSOR DUAL FLEX - 5/BX

## (undated) DEVICE — SET LEADWIRE 5 LEAD BEDSIDE DISPOSABLE ECG (1SET OF 5/EA)

## (undated) DEVICE — SENSOR OXIMETER ADULT SPO2 RD SET (20EA/BX)

## (undated) DEVICE — WATER IRRIG. STER 3000 ML - (4/CA)

## (undated) DEVICE — CANISTER SUCTION 3000ML MECHANICAL FILTER AUTO SHUTOFF MEDI-VAC NONSTERILE LF DISP  (40EA/CA)

## (undated) DEVICE — WATER IRRIGATION STERILE 1000ML (12EA/CA)

## (undated) DEVICE — CONTAINER SPECIMEN BAG OR - STERILE 4 OZ W/LID (100EA/CA)

## (undated) DEVICE — TUBE CONNECT SUCTION CLEAR 120 X 1/4" (50EA/CA)"

## (undated) DEVICE — SHEATH NAVIGATOR 11/13 X 36 URETERAL ACCESS (5EA/BX)

## (undated) DEVICE — KIT ANESTHESIA W/CIRCUIT & 3/LT BAG W/FILTER (20EA/CA)

## (undated) DEVICE — SODIUM CHL. IRRIGATION 0.9% 3000ML (4EA/CA 65CA/PF)

## (undated) DEVICE — BAG URODRAIN WITH TUBING - (20/CA)

## (undated) DEVICE — MAT PATIENT POSITIONING PREVALON (10EA/CA)

## (undated) DEVICE — GOWN WARMING STANDARD FLEX - (30/CA)

## (undated) DEVICE — SLEEVE, VASO, THIGH, MED

## (undated) DEVICE — GOWN SURGEONS X-LARGE - DISP. (30/CA)

## (undated) DEVICE — BASKET ZERO 1.9FR X 120CM

## (undated) DEVICE — ELECTRODE 850 FOAM ADHESIVE - HYDROGEL RADIOTRNSPRNT (50/PK)

## (undated) DEVICE — CATHETER URET OPEN END 6FR (10EA/BX)

## (undated) DEVICE — PROTECTOR ULNA NERVE - (36PR/CA)

## (undated) DEVICE — COVER FOOT UNIVERSAL DISP. - (25EA/CA)

## (undated) DEVICE — JELLY SURGILUBE STERILE TUBE 4.25 OZ (1/EA)

## (undated) DEVICE — MASK ANESTHESIA ADULT  - (100/CA)

## (undated) DEVICE — TUBING CLEARLINK DUO-VENT - C-FLO (48EA/CA)

## (undated) DEVICE — DRAPE UNDER BUTTOCK LRG (40/CA)

## (undated) DEVICE — NEPTUNE 4 PORT MANIFOLD - (20/PK)

## (undated) DEVICE — SUCTION INSTRUMENT YANKAUER BULBOUS TIP W/O VENT (50EA/CA)

## (undated) DEVICE — GOWN SURGICAL X-LARGE ULTRA - FILM-REINFORCED (20/CA)

## (undated) DEVICE — GLOVE BIOGEL INDICATOR SZ 7SURGICAL PF LTX - (50/BX 4BX/CA)

## (undated) DEVICE — SPONGE GAUZESTER 4 X 4 4PLY - (128PK/CA)

## (undated) DEVICE — SET EXTENSION WITH 2 PORTS (48EA/CA) ***PART #2C8610 IS A SUBSTITUTE*****